# Patient Record
Sex: MALE | Race: BLACK OR AFRICAN AMERICAN
[De-identification: names, ages, dates, MRNs, and addresses within clinical notes are randomized per-mention and may not be internally consistent; named-entity substitution may affect disease eponyms.]

---

## 2018-04-20 ENCOUNTER — HOSPITAL ENCOUNTER (INPATIENT)
Dept: HOSPITAL 72 - EMR | Age: 68
LOS: 4 days | Discharge: LEFT BEFORE DISCHARGE | DRG: 140 | End: 2018-04-24
Payer: MEDICARE

## 2018-04-20 VITALS — BODY MASS INDEX: 34.21 KG/M2 | WEIGHT: 218 LBS | HEIGHT: 67 IN

## 2018-04-20 DIAGNOSIS — J98.01: ICD-10-CM

## 2018-04-20 DIAGNOSIS — I20.0: ICD-10-CM

## 2018-04-20 DIAGNOSIS — R10.9: ICD-10-CM

## 2018-04-20 DIAGNOSIS — K27.9: ICD-10-CM

## 2018-04-20 DIAGNOSIS — K20.9: ICD-10-CM

## 2018-04-20 DIAGNOSIS — G40.909: ICD-10-CM

## 2018-04-20 DIAGNOSIS — F31.9: ICD-10-CM

## 2018-04-20 DIAGNOSIS — E87.6: ICD-10-CM

## 2018-04-20 DIAGNOSIS — R07.89: ICD-10-CM

## 2018-04-20 DIAGNOSIS — I25.5: ICD-10-CM

## 2018-04-20 DIAGNOSIS — I11.0: ICD-10-CM

## 2018-04-20 DIAGNOSIS — J44.1: Primary | ICD-10-CM

## 2018-04-20 DIAGNOSIS — R11.2: ICD-10-CM

## 2018-04-20 DIAGNOSIS — R07.81: ICD-10-CM

## 2018-04-20 DIAGNOSIS — I51.3: ICD-10-CM

## 2018-04-20 DIAGNOSIS — Z85.46: ICD-10-CM

## 2018-04-20 DIAGNOSIS — K21.9: ICD-10-CM

## 2018-04-20 DIAGNOSIS — R12: ICD-10-CM

## 2018-04-20 DIAGNOSIS — I50.9: ICD-10-CM

## 2018-04-20 DIAGNOSIS — E78.5: ICD-10-CM

## 2018-04-20 PROCEDURE — 82553 CREATINE MB FRACTION: CPT

## 2018-04-20 PROCEDURE — 83735 ASSAY OF MAGNESIUM: CPT

## 2018-04-20 PROCEDURE — 82550 ASSAY OF CK (CPK): CPT

## 2018-04-20 PROCEDURE — 80053 COMPREHEN METABOLIC PANEL: CPT

## 2018-04-20 PROCEDURE — 94003 VENT MGMT INPAT SUBQ DAY: CPT

## 2018-04-20 PROCEDURE — 80307 DRUG TEST PRSMV CHEM ANLYZR: CPT

## 2018-04-20 PROCEDURE — 82962 GLUCOSE BLOOD TEST: CPT

## 2018-04-20 PROCEDURE — 84484 ASSAY OF TROPONIN QUANT: CPT

## 2018-04-20 PROCEDURE — 85025 COMPLETE CBC W/AUTO DIFF WBC: CPT

## 2018-04-20 PROCEDURE — 80185 ASSAY OF PHENYTOIN TOTAL: CPT

## 2018-04-20 PROCEDURE — 94640 AIRWAY INHALATION TREATMENT: CPT

## 2018-04-20 PROCEDURE — 94760 N-INVAS EAR/PLS OXIMETRY 1: CPT

## 2018-04-20 PROCEDURE — 93005 ELECTROCARDIOGRAM TRACING: CPT

## 2018-04-20 PROCEDURE — 80299 QUANTITATIVE ASSAY DRUG: CPT

## 2018-04-20 PROCEDURE — 84443 ASSAY THYROID STIM HORMONE: CPT

## 2018-04-20 PROCEDURE — 36415 COLL VENOUS BLD VENIPUNCTURE: CPT

## 2018-04-20 PROCEDURE — 93306 TTE W/DOPPLER COMPLETE: CPT

## 2018-04-20 PROCEDURE — 94150 VITAL CAPACITY TEST: CPT

## 2018-04-20 PROCEDURE — 99285 EMERGENCY DEPT VISIT HI MDM: CPT

## 2018-04-20 PROCEDURE — 76700 US EXAM ABDOM COMPLETE: CPT

## 2018-04-20 PROCEDURE — 71045 X-RAY EXAM CHEST 1 VIEW: CPT

## 2018-04-20 PROCEDURE — 94664 DEMO&/EVAL PT USE INHALER: CPT

## 2018-04-21 VITALS — DIASTOLIC BLOOD PRESSURE: 70 MMHG | SYSTOLIC BLOOD PRESSURE: 120 MMHG

## 2018-04-21 VITALS — SYSTOLIC BLOOD PRESSURE: 115 MMHG | DIASTOLIC BLOOD PRESSURE: 61 MMHG

## 2018-04-21 VITALS — DIASTOLIC BLOOD PRESSURE: 88 MMHG | SYSTOLIC BLOOD PRESSURE: 133 MMHG

## 2018-04-21 VITALS — DIASTOLIC BLOOD PRESSURE: 88 MMHG | SYSTOLIC BLOOD PRESSURE: 128 MMHG

## 2018-04-21 VITALS — SYSTOLIC BLOOD PRESSURE: 113 MMHG | DIASTOLIC BLOOD PRESSURE: 86 MMHG

## 2018-04-21 VITALS — DIASTOLIC BLOOD PRESSURE: 75 MMHG | SYSTOLIC BLOOD PRESSURE: 148 MMHG

## 2018-04-21 LAB
ADD MANUAL DIFF: NO
ADD MANUAL DIFF: NO
ALBUMIN SERPL-MCNC: 2.9 G/DL (ref 3.4–5)
ALBUMIN SERPL-MCNC: 3.7 G/DL (ref 3.4–5)
ALBUMIN/GLOB SERPL: 0.7 {RATIO} (ref 1–2.7)
ALBUMIN/GLOB SERPL: 0.8 {RATIO} (ref 1–2.7)
ALP SERPL-CCNC: 55 U/L (ref 46–116)
ALP SERPL-CCNC: 69 U/L (ref 46–116)
ALT SERPL-CCNC: 15 U/L (ref 12–78)
ALT SERPL-CCNC: 18 U/L (ref 12–78)
ANION GAP SERPL CALC-SCNC: 10 MMOL/L (ref 5–15)
ANION GAP SERPL CALC-SCNC: 11 MMOL/L (ref 5–15)
AST SERPL-CCNC: 19 U/L (ref 15–37)
AST SERPL-CCNC: 21 U/L (ref 15–37)
BASOPHILS NFR BLD AUTO: 0.9 % (ref 0–2)
BASOPHILS NFR BLD AUTO: 1.6 % (ref 0–2)
BILIRUB SERPL-MCNC: 0.2 MG/DL (ref 0.2–1)
BILIRUB SERPL-MCNC: 0.3 MG/DL (ref 0.2–1)
BUN SERPL-MCNC: 10 MG/DL (ref 7–18)
BUN SERPL-MCNC: 8 MG/DL (ref 7–18)
CALCIUM SERPL-MCNC: 8.5 MG/DL (ref 8.5–10.1)
CALCIUM SERPL-MCNC: 9.8 MG/DL (ref 8.5–10.1)
CHLORIDE SERPL-SCNC: 102 MMOL/L (ref 98–107)
CHLORIDE SERPL-SCNC: 107 MMOL/L (ref 98–107)
CK MB SERPL-MCNC: 0.5 NG/ML (ref 0–3.6)
CK SERPL-CCNC: 157 U/L (ref 26–308)
CO2 SERPL-SCNC: 23 MMOL/L (ref 21–32)
CO2 SERPL-SCNC: 27 MMOL/L (ref 21–32)
CREAT SERPL-MCNC: 1.1 MG/DL (ref 0.55–1.3)
CREAT SERPL-MCNC: 1.3 MG/DL (ref 0.55–1.3)
EOSINOPHIL NFR BLD AUTO: 0.2 % (ref 0–3)
EOSINOPHIL NFR BLD AUTO: 4 % (ref 0–3)
ERYTHROCYTE [DISTWIDTH] IN BLOOD BY AUTOMATED COUNT: 14.7 % (ref 11.6–14.8)
ERYTHROCYTE [DISTWIDTH] IN BLOOD BY AUTOMATED COUNT: 14.9 % (ref 11.6–14.8)
GLOBULIN SER-MCNC: 3.9 G/DL
GLOBULIN SER-MCNC: 4.8 G/DL
HCT VFR BLD CALC: 38.6 % (ref 42–52)
HCT VFR BLD CALC: 46.6 % (ref 42–52)
HGB BLD-MCNC: 12.9 G/DL (ref 14.2–18)
HGB BLD-MCNC: 15.6 G/DL (ref 14.2–18)
LYMPHOCYTES NFR BLD AUTO: 21 % (ref 20–45)
LYMPHOCYTES NFR BLD AUTO: 52.3 % (ref 20–45)
MCV RBC AUTO: 91 FL (ref 80–99)
MCV RBC AUTO: 92 FL (ref 80–99)
MONOCYTES NFR BLD AUTO: 11.5 % (ref 1–10)
MONOCYTES NFR BLD AUTO: 3.3 % (ref 1–10)
NEUTROPHILS NFR BLD AUTO: 30.6 % (ref 45–75)
NEUTROPHILS NFR BLD AUTO: 74.6 % (ref 45–75)
PLATELET # BLD: 204 K/UL (ref 150–450)
PLATELET # BLD: 236 K/UL (ref 150–450)
POTASSIUM SERPL-SCNC: 3.3 MMOL/L (ref 3.5–5.1)
POTASSIUM SERPL-SCNC: 4.2 MMOL/L (ref 3.5–5.1)
RBC # BLD AUTO: 4.23 M/UL (ref 4.7–6.1)
RBC # BLD AUTO: 5.05 M/UL (ref 4.7–6.1)
SODIUM SERPL-SCNC: 139 MMOL/L (ref 136–145)
SODIUM SERPL-SCNC: 141 MMOL/L (ref 136–145)
WBC # BLD AUTO: 4.6 K/UL (ref 4.8–10.8)
WBC # BLD AUTO: 4.9 K/UL (ref 4.8–10.8)

## 2018-04-21 RX ADMIN — HEPARIN SODIUM SCH UNITS: 5000 INJECTION INTRAVENOUS; SUBCUTANEOUS at 20:48

## 2018-04-21 RX ADMIN — PHENYTOIN SCH MG: 125 SUSPENSION ORAL at 20:45

## 2018-04-21 RX ADMIN — MONTELUKAST SODIUM SCH MG: 10 TABLET, COATED ORAL at 16:04

## 2018-04-21 RX ADMIN — IPRATROPIUM BROMIDE AND ALBUTEROL SULFATE SCH ML: .5; 3 SOLUTION RESPIRATORY (INHALATION) at 14:45

## 2018-04-21 RX ADMIN — IPRATROPIUM BROMIDE AND ALBUTEROL SULFATE SCH ML: .5; 3 SOLUTION RESPIRATORY (INHALATION) at 10:57

## 2018-04-21 RX ADMIN — Medication PRN MG: at 20:46

## 2018-04-21 RX ADMIN — IPRATROPIUM BROMIDE AND ALBUTEROL SULFATE SCH ML: .5; 3 SOLUTION RESPIRATORY (INHALATION) at 19:00

## 2018-04-21 RX ADMIN — Medication PRN MG: at 18:18

## 2018-04-21 RX ADMIN — METHYLPREDNISOLONE SODIUM SUCCINATE SCH MG: 40 INJECTION, POWDER, LYOPHILIZED, FOR SOLUTION INTRAMUSCULAR; INTRAVENOUS at 13:43

## 2018-04-21 RX ADMIN — METHYLPREDNISOLONE SODIUM SUCCINATE SCH MG: 40 INJECTION, POWDER, LYOPHILIZED, FOR SOLUTION INTRAMUSCULAR; INTRAVENOUS at 05:33

## 2018-04-21 RX ADMIN — IPRATROPIUM BROMIDE AND ALBUTEROL SULFATE SCH ML: .5; 3 SOLUTION RESPIRATORY (INHALATION) at 22:22

## 2018-04-21 RX ADMIN — METHYLPREDNISOLONE SODIUM SUCCINATE SCH MG: 40 INJECTION, POWDER, LYOPHILIZED, FOR SOLUTION INTRAMUSCULAR; INTRAVENOUS at 21:05

## 2018-04-21 RX ADMIN — IPRATROPIUM BROMIDE AND ALBUTEROL SULFATE SCH ML: .5; 3 SOLUTION RESPIRATORY (INHALATION) at 07:14

## 2018-04-21 RX ADMIN — LOSARTAN POTASSIUM SCH MG: 50 TABLET, FILM COATED ORAL at 20:45

## 2018-04-21 NOTE — DIAGNOSTIC IMAGING REPORT
Indication: Shortness of breath

 

Technique: XRAY Chest 1v

 

Comparison: 9/25/2013

 

Findings: Cardiac silhouette appears prominent. Atherosclerotic changes are seen.

There is mild pulmonary vascular congestion. Atelectasis is noted in the lung bases.

Degenerative changes of the spine are seen.

 

Impression: 

 

Mild pulmonary vascular congestion. Lung base atelectasis. Clinical

correlation/follow-up recommended.

## 2018-04-21 NOTE — EMERGENCY ROOM REPORT
History of Present Illness


General


Chief Complaint:  Dyspnea/Respdistress


Source:  Patient





Present Illness


HPI


Patient is a 68-year-old male brought in by EMS after increased difficulty 

breathing.  Patient prior history of asthma.  He reports having increased 

nonproductive cough.  He had been given breathing treatment by EMS.  Patient 

reported having gradually worsening symptoms.  Patient had prior history of 

seizure disorder as well.  The patient reports being compliant with his 

Dilantin as well as Lamictal.  He denied recent seizure.  The patient denies 

any chest pain.


Allergies:  


Coded Allergies:  


     No Known Allergies (Verified , 11/3/06)





Patient History


Past Medical History:  see triage record


Reviewed Nursing Documentation:  PMH: Agreed; PSxH: Agreed





Nursing Documentation-PMH


Hx Cardiac Problems:  Yes - HIGH CHOLESTEROL


Hx Hypertension:  Yes


Hx Asthma:  Yes


Hx Cancer:  Yes


Hx Seizures:  Yes


Hx Headaches:  Yes





Review of Systems


All Other Systems:  limited - by poor historian





Physical Exam





Vital Signs








  Date Time  Temp Pulse Resp B/P (MAP) Pulse Ox O2 Delivery O2 Flow Rate FiO2


 


4/20/18 23:34 98.5 80 14 115/61 98   





 98.4       


 


4/21/18 00:30      Room Air  








Sp02 EP Interpretation:  reviewed, normal


General Appearance:  normal inspection, alert, mild distress


Head:  atraumatic


ENT:  normal ENT inspection, hearing grossly normal, normal voice


Neck:  normal inspection, full range of motion, supple, no bony tend


Respiratory:  no retraction, wheezing, expiration - prolonged expiration


Cardiovascular #1:  regular rate, rhythm, no edema


Gastrointestinal:  normal inspection, normal bowel sounds, non tender, soft, no 

guarding, no hernia


Genitourinary:  no CVA tenderness


Musculoskeletal:  normal inspection, back normal, normal range of motion


Neurologic:  normal inspection, alert, oriented x3, responsive, CNs III-XII nml 

as tested, speech normal


Psychiatric:  normal inspection, judgement/insight normal, mood/affect normal


Skin:  normal inspection, normal color, no rash





Medical Decision Making


Diagnostic Impression:  


 Primary Impression:  


 Asthma exacerbation


 Additional Impression:  


 Seizure disorder


ER Course


Patient presented for shortness of breath.  Differential included but was not 

limited to anemia, pneumonia, pneumothorax, myocardial infarction, pericardial 

effusion, congestive heart failure, acidosis. Because of complexity of patient'

s case laboratory testing and imaging studies were ordered.


Laboratory studies were unremarkable.  The patient was given IV steroids as 

well as breathing treatments.  Chest x-ray one view interpreted by me showed 

cardiomegaly without evident infiltrate.  EKG interpreted by me showed normal 

sinus rhythm without acute ST or T wave changes.Dr. Zheng Moreno was contacted 

for inpatient management due to continued shortness of breath.





Labs








Test


  4/21/18


00:28


 


White Blood Count


  4.6 K/UL


(4.8-10.8)


 


Red Blood Count


  4.23 M/UL


(4.70-6.10)


 


Hemoglobin


  12.9 G/DL


(14.2-18.0)


 


Hematocrit


  38.6 %


(42.0-52.0)


 


Mean Corpuscular Volume 91 FL (80-99) 


 


Mean Corpuscular Hemoglobin


  30.4 PG


(27.0-31.0)


 


Mean Corpuscular Hemoglobin


Concent 33.4 G/DL


(32.0-36.0)


 


Red Cell Distribution Width


  14.9 %


(11.6-14.8)


 


Platelet Count


  204 K/UL


(150-450)


 


Mean Platelet Volume


  5.0 FL


(6.5-10.1)


 


Neutrophils (%) (Auto)


  30.6 %


(45.0-75.0)


 


Lymphocytes (%) (Auto)


  52.3 %


(20.0-45.0)


 


Monocytes (%) (Auto)


  11.5 %


(1.0-10.0)


 


Eosinophils (%) (Auto)


  4.0 %


(0.0-3.0)


 


Basophils (%) (Auto)


  1.6 %


(0.0-2.0)


 


Sodium Level


  141 MMOL/L


(136-145)


 


Potassium Level


  3.3 MMOL/L


(3.5-5.1)


 


Chloride Level


  107 MMOL/L


()


 


Carbon Dioxide Level


  23 MMOL/L


(21-32)


 


Anion Gap


  11 mmol/L


(5-15)


 


Blood Urea Nitrogen 8 mg/dL (7-18) 


 


Creatinine


  1.1 MG/DL


(0.55-1.30)


 


Estimat Glomerular Filtration


Rate > 60 mL/min


(>60)


 


Glucose Level


  99 MG/DL


()


 


Calcium Level


  8.5 MG/DL


(8.5-10.1)


 


Total Bilirubin


  0.2 MG/DL


(0.2-1.0)


 


Aspartate Amino Transf


(AST/SGOT) 19 U/L (15-37) 


 


 


Alanine Aminotransferase


(ALT/SGPT) 15 U/L (12-78) 


 


 


Alkaline Phosphatase


  55 U/L


()


 


Total Creatine Kinase


  157 U/L


()


 


Creatine Kinase MB


  0.5 NG/ML


(0.0-3.6)


 


Creatine Kinase MB Relative


Index 0.3 


 


 


Troponin I


  0.004 ng/mL


(0.000-0.056)


 


Total Protein


  6.8 G/DL


(6.4-8.2)


 


Albumin


  2.9 G/DL


(3.4-5.0)


 


Globulin 3.9 g/dL 


 


Albumin/Globulin Ratio 0.7 (1.0-2.7) 


 


Urine Opiates Screen


  Negative


(NEGATIVE)


 


Urine Barbiturates Screen


  Negative


(NEGATIVE)


 


Phencyclidine (PCP) Screen


  Negative


(NEGATIVE)


 


Urine Amphetamines Screen


  Negative


(NEGATIVE)


 


Urine Benzodiazepines Screen


  Negative


(NEGATIVE)


 


Urine Cocaine Screen


  Negative


(NEGATIVE)


 


Urine Marijuana (THC) Screen


  Negative


(NEGATIVE)











Last Vital Signs








  Date Time  Temp Pulse Resp B/P (MAP) Pulse Ox O2 Delivery O2 Flow Rate FiO2


 


4/21/18 03:53 98.4 89 18 115/61 100 Room Air  





 98.4       








Status:  unchanged


Disposition:  ADMITTED AS INPATIENT


Condition:  Stable


Referrals:  


NON PHYSICIAN (PCP)











Rocael Soler Apr 21, 2018 05:34

## 2018-04-21 NOTE — CONSULTATION
DATE OF CONSULTATION:  04/21/2018



CARDIOLOGY CONSULTATION



CONSULTING PHYSICIAN:  Zheng Moreno M.D.



REQUESTING PHYSICIAN:  Steven Prakash M.D.



REASON FOR CONSULTATION:  Shortness of breath and chest pain.



HISTORY OF PRESENT ILLNESS:  This is a 68-year-old  male.

He presented to the emergency room by paramedics with difficulty

breathing, chest tightness, and abdominal fullness.  He has had increasing

nonproductive cough over the past day or two and has increased the use of

his respiratory treatments at home.  He has albuterol and ipratropium that

he uses by nebulizer.  He also has a CPAP machine.



The patient has not had any fevers or chills, nausea, vomiting, or

change in bowel habits.  He does have a seizure disorder.  He takes

Dilantin and Lamictal and notes being compliant.  He has not had chest

pain, but notes tightness in his chest with breathing.



PAST MEDICAL HISTORY:  Hypertension, chronic obstructive pulmonary disease,

seizure disorder, history of prostate cancer, hyperlipidemia, and sleep

disorder.



MEDICATIONS:  Reviewed and reconciled.



ALLERGIES:  None.



FAMILY HISTORY:  Noncontributory.



REVIEW OF SYSTEMS:  No fevers or chills.  No history of blood clots in the

legs.  No history of positive PPD or tuberculosis exposure.  No history of

stroke.  He does have a seizure disorder, but no recent seizures noted.

No nausea or vomiting.  No change in bowel habits.  No history of diabetes

or thyroid disorder.



PHYSICAL EXAMINATION:

VITAL SIGNS:  Blood pressure 115/61, pulse 80, respirations 14, afebrile,

and 98% oxygen saturation.

NECK:  Supple.

LUNGS:  With diminished breath sounds.  Scattered expiratory wheezes and

rales.  No subcostal retractions.  No accessory muscle use.

CARDIAC:  Regular rhythm and rate.  Normal S1, S2 with no murmur, rub, or

gallop.

ABDOMEN:  Distended, but soft.  No guarding or rebound.  No CVA

tenderness.

EXTREMITIES:  No clubbing, cyanosis, or edema.  Capillary refill is good.

NEUROLOGIC:  Nonfocal with no tremor.



LABORATORY DATA:  White count 4.6 and hemoglobin 12.9.  BUN 11 and

creatinine 1.1.  Sodium 141, potassium 3.3, and bicarbonate 23.  Troponin

is 0.004 CK is 157.  Tox screen is negative.



IMPRESSION:

1. Acute respiratory insufficiency.

2. Chronic obstructive pulmonary disease exacerbation.

3. Acute bronchospasm.

4. Pleuritic chest pain.

5. History of hypertension.

6. Hypokalemia.

7. Acute myocardial ischemia.

8. Seizure disorder.



PLAN:

1. Cardiac monitoring.

2. Replace potassium.

3. Inhaled bronchodilators.

4. Intravenous steroids.

5. Hold antibiotics.

6. DVT and stress ulcer prophylaxes.

7. Check Dilantin level and re-dose accordingly.

8. We will discuss with primary care physician and pulmonologist further

plan of care.

9. No additional cardiovascular workup presently planned other than

echocardiogram for assessment of PA systolic pressure.









  ______________________________________________

  Zheng Moreno M.D.





DR:  GABRIELLA

D:  04/21/2018 15:51

T:  04/21/2018 19:50

JOB#:  [id number]

CC:

## 2018-04-21 NOTE — CONSULTATION
DATE OF CONSULTATION:  04/21/2018



PULMONARY CONSULTATION



REASON FOR CONSULTATION:  Asthma.



HISTORY OF PRESENT ILLNESS:  The patient is a 68-year-old male brought in

with increasing difficulty breathing.  The patient with history of asthma.

The patient with history of cough which is mostly nonproductive.  The

patient noted worsening of symptoms, seen and evaluated in the emergency

room.  In the emergency room, the patient was given breathing treatments

as well as IV Solu-Medrol.  The patient did undergo imaging and now

admitted for further care and management and stabilization.  I was asked

to evaluate and recommend further.



PAST MEDICAL HISTORY:  Notable for hypertension, asthma, seizures,

headaches.



MEDICATIONS:  Reviewed.



ALLERGIES:  Reviewed.



SOCIAL HISTORY:  Currently nonsmoker and nondrinker.  The patient is

otherwise independent.  The patient is retired.



REVIEW OF SYSTEMS:  Otherwise negative with the exception of the above.



PHYSICAL EXAMINATION:

GENERAL:  A well-developed male.  The patient is in no acute distress.

VITAL SIGNS:  Blood pressure 148/75, O2 saturations 93% on room air,

respiratory rate 18, temperature 97 degrees.

HEENT:  Negative.  Extraocular movements grossly intact

NECK:  Supple.

LUNGS:  With moderate breath sounds, symmetric, wheezes scattered.

 

CARDIAC:  S1 and S2.  Regular rate and rhythm without murmurs, rubs,

gallops.

ABDOMEN:  Soft, nontender, nondistended.

EXTREMITIES:  No cyanosis, clubbing, or edema.

NEUROLOGIC:  Grossly nonfocal.



LABORATORY DATA:  Reviewed.  White count 4.6, hemoglobin 12.9.  Chemistry

is noted notable for potassium of 2.3.  Albumin 2.9.



IMPRESSION:

1. Asthma with acute exacerbation.

2. Shortness of breath.

3. History of seizures.

4. History of headaches.

5. Possible underlying respiratory infection.



RECOMMENDATIONS:  I agree with management.  Albuterol for now.  DVT

prophylaxis.  IV Solu-Medrol.  Monitor clinically.  Discharge on

combination of inhaled steroids and long-acting bronchodilator as well as

prednisone taper.  Once improved with outpatient followup, pulmonary

function testing and further evaluation for allergic triggers.  Care

discussed and reviewed with the patient.  I will follow clinically.









  ______________________________________________

  Kenneth Burns M.D.





:  Tommy

D:  04/21/2018 09:39

T:  04/21/2018 17:39

JOB#:  [id number]

CC:



RICHARD

## 2018-04-22 VITALS — DIASTOLIC BLOOD PRESSURE: 86 MMHG | SYSTOLIC BLOOD PRESSURE: 137 MMHG

## 2018-04-22 VITALS — SYSTOLIC BLOOD PRESSURE: 146 MMHG | DIASTOLIC BLOOD PRESSURE: 95 MMHG

## 2018-04-22 VITALS — DIASTOLIC BLOOD PRESSURE: 69 MMHG | SYSTOLIC BLOOD PRESSURE: 139 MMHG

## 2018-04-22 VITALS — DIASTOLIC BLOOD PRESSURE: 75 MMHG | SYSTOLIC BLOOD PRESSURE: 125 MMHG

## 2018-04-22 VITALS — SYSTOLIC BLOOD PRESSURE: 135 MMHG | DIASTOLIC BLOOD PRESSURE: 86 MMHG

## 2018-04-22 VITALS — SYSTOLIC BLOOD PRESSURE: 128 MMHG | DIASTOLIC BLOOD PRESSURE: 81 MMHG

## 2018-04-22 RX ADMIN — IPRATROPIUM BROMIDE AND ALBUTEROL SULFATE SCH ML: .5; 3 SOLUTION RESPIRATORY (INHALATION) at 06:52

## 2018-04-22 RX ADMIN — LOSARTAN POTASSIUM SCH MG: 50 TABLET, FILM COATED ORAL at 20:36

## 2018-04-22 RX ADMIN — METHYLPREDNISOLONE SODIUM SUCCINATE SCH MG: 40 INJECTION, POWDER, LYOPHILIZED, FOR SOLUTION INTRAMUSCULAR; INTRAVENOUS at 05:59

## 2018-04-22 RX ADMIN — Medication PRN MG: at 20:36

## 2018-04-22 RX ADMIN — MONTELUKAST SODIUM SCH MG: 10 TABLET, COATED ORAL at 17:09

## 2018-04-22 RX ADMIN — METHYLPREDNISOLONE SODIUM SUCCINATE SCH MG: 40 INJECTION, POWDER, LYOPHILIZED, FOR SOLUTION INTRAMUSCULAR; INTRAVENOUS at 14:46

## 2018-04-22 RX ADMIN — PHENYTOIN SCH MG: 125 SUSPENSION ORAL at 08:39

## 2018-04-22 RX ADMIN — HEPARIN SODIUM SCH UNITS: 5000 INJECTION INTRAVENOUS; SUBCUTANEOUS at 08:44

## 2018-04-22 RX ADMIN — Medication PRN MG: at 07:36

## 2018-04-22 RX ADMIN — Medication PRN MG: at 12:44

## 2018-04-22 RX ADMIN — IPRATROPIUM BROMIDE AND ALBUTEROL SULFATE SCH ML: .5; 3 SOLUTION RESPIRATORY (INHALATION) at 19:22

## 2018-04-22 RX ADMIN — FLUTICASONE PROPIONATE AND SALMETEROL SCH PUFFS: 50; 250 POWDER RESPIRATORY (INHALATION) at 19:21

## 2018-04-22 RX ADMIN — IPRATROPIUM BROMIDE AND ALBUTEROL SULFATE SCH ML: .5; 3 SOLUTION RESPIRATORY (INHALATION) at 15:16

## 2018-04-22 RX ADMIN — IPRATROPIUM BROMIDE AND ALBUTEROL SULFATE SCH ML: .5; 3 SOLUTION RESPIRATORY (INHALATION) at 02:59

## 2018-04-22 RX ADMIN — LOSARTAN POTASSIUM SCH MG: 50 TABLET, FILM COATED ORAL at 08:39

## 2018-04-22 RX ADMIN — HEPARIN SODIUM SCH UNITS: 5000 INJECTION INTRAVENOUS; SUBCUTANEOUS at 20:40

## 2018-04-22 RX ADMIN — IPRATROPIUM BROMIDE AND ALBUTEROL SULFATE SCH ML: .5; 3 SOLUTION RESPIRATORY (INHALATION) at 23:07

## 2018-04-22 RX ADMIN — IPRATROPIUM BROMIDE AND ALBUTEROL SULFATE SCH ML: .5; 3 SOLUTION RESPIRATORY (INHALATION) at 11:29

## 2018-04-22 NOTE — PULMONOLOGY PROGRESS NOTE
Assessment/Plan


Assessment/Plan


1. Asthma with acute exacerbation.


2. Shortness of breath.


3. History of seizures.


4. History of headaches.





PLAN


Continue same for now


IV Solu-Medrol as is and plan to taper in a.m.


Nebulized therapy as outlined


Oxygen therapy as needed


Ambulate as able


We will start inhaled steroids as well as long-acting bronchodilators


Plan for outpatient follow-up including pulmonary function testing and allergy 

testing


impression, plan, and exam edited and reviewed in detail


care discussed with RN





Subjective


Allergies:  


Coded Allergies:  


     No Known Allergies (Verified , 11/3/06)


Subjective


Care note and reviewed


Patient seems to be a bit better





Objective





Last 24 Hour Vital Signs








  Date Time  Temp Pulse Resp B/P (MAP) Pulse Ox O2 Delivery O2 Flow Rate FiO2


 


4/22/18 11:29  90 20  95 Nasal Cannula 2.0 28


 


4/22/18 08:39    125/75    


 


4/22/18 08:00  104      


 


4/22/18 08:00 97.9 104 20 125/75 91 Nasal Cannula 2.0 





 97.9       


 


4/22/18 06:58  103 20  94 Room Air  


 


4/22/18 06:55      Room Air  21


 


4/22/18 06:55     94 Room Air  21


 


4/22/18 06:52  91 20  92 Room Air  21


 


4/22/18 04:00  86      


 


4/22/18 04:00 97.9 92 20 139/69 95 Nasal Cannula 2.0 





 97.9       


 


4/22/18 03:10  90 18  97 Room Air  


 


4/22/18 02:59  91 22  93 Room Air  21


 


4/22/18 00:00  82      


 


4/22/18 00:00 97.7 81 20 146/95 90 Nasal Cannula 2.0 





 97.7       


 


4/21/18 22:32  88 18  97 Room Air  


 


4/21/18 22:22  85 22  94 Room Air  21


 


4/21/18 20:45    133/88    


 


4/21/18 20:15      Nasal Cannula  


 


4/21/18 20:15      Room Air  


 


4/21/18 20:15      Nasal Cannula 2.0 28


 


4/21/18 20:15     95 Nasal Cannula 2.0 28


 


4/21/18 20:00 97.3 93 20 133/88 95 Nasal Cannula 2.0 





 97.3       


 


4/21/18 20:00  91      


 


4/21/18 16:00 97.2 94 18 128/88 95 Nasal Cannula 2.0 





 97.2       


 


4/21/18 16:00  92      


 


4/21/18 14:55     96 Nasal Cannula 2.0 28


 


4/21/18 14:55  85 18  97 Nasal Cannula 2.0 28


 


4/21/18 14:55      Nasal Cannula 2.0 28


 


4/21/18 14:45  83 18  92 Room Air  21


 


4/21/18 12:00  84      


 


4/21/18 12:00 97.0 81 20 120/70 98 Room Air  





 97.0       

















Intake and Output  


 


 4/21/18 4/22/18





 19:00 07:00


 


Intake Total 480 ml 500 ml


 


Output Total  100 ml


 


Balance 480 ml 400 ml


 


  


 


Intake Oral 480 ml 500 ml


 


Output Emesis  100 ml


 


# Voids 3 3








Objective


Well-developed well-nourished male


No acute distress


No jugular venous distention


Lungs with reduced air entry with occasional wheeze


Cardiac exam with normal S1-S2 regular rate rhythm without murmurs rubs gallops


Exam on the abdomen is overall benign no normoactive bowel sounds


No cyanosis clubbing or edema


Neurologically grossly nonfocal and alert





Current Medications








 Medications


  (Trade)  Dose


 Ordered  Sig/Magen


 Route


 PRN Reason  Start Time


 Stop Time Status Last Admin


Dose Admin


 


 Albuterol/


 Ipratropium


  (Albuterol/


 Ipratropium)  3 ml  Q4HRT


 HHN


   4/21/18 07:00


 4/26/18 06:59  4/22/18 11:29


 


 


 Calcium Carbonate


  (Tums)  500 mg  THREE TIMES A DAY  PRN


 ORAL


 HEARTBURN  4/21/18 10:30


 5/21/18 10:29  4/22/18 07:36


 


 


 Guaifenesin/


 Dextromethorphan


  (Robitussin DM)  10 ml  Q4H  PRN


 ORAL


 For Cough  4/22/18 10:00


 5/22/18 09:59  4/22/18 09:40


 


 


 Heparin Sodium


  (Porcine)


  (Heparin 5000


 units/ml)  5,000 units  EVERY 12  HOURS


 SUBQ


   4/21/18 21:00


 5/21/18 20:59  4/22/18 08:44


 


 


 Losartan Potassium


  (Cozaar)  50 mg  EVERY 12  HOURS


 ORAL


   4/21/18 21:00


 5/21/18 20:59  4/22/18 08:39


 


 


 Methylprednisolone


 Sodium Succinate


  (Solu-MEDROL)  40 mg  EVERY 8  HOURS


 IVP


   4/21/18 06:00


 5/21/18 05:59  4/22/18 05:59


 


 


 Montelukast Sodium


  (Singulair)  10 mg  QPM


 ORAL


   4/21/18 16:30


 5/21/18 16:29  4/21/18 16:04


 


 


 Ondansetron HCl


  (Zofran)  4 mg  Q4H  PRN


 IVP


 Nausea & Vomiting  4/22/18 02:00


 5/22/18 01:59  4/22/18 07:44


 


 


 Pantoprazole


  (Protonix)  40 mg  DAILY


 ORAL


   4/22/18 10:00


 5/22/18 09:59  4/22/18 10:19


 


 


 Phenytoin


  (Dilantin)  300 mg  EVERY 12  HOURS


 NG


   4/21/18 21:00


 5/21/18 20:59  4/22/18 08:39


 


 


 Quetiapine


 Fumarate


  (SEROquel)  150 mg  BEDTIME


 ORAL


   4/21/18 21:00


 5/21/18 20:59  4/21/18 20:46


 

















CARIE OLIVER Apr 22, 2018 11:49

## 2018-04-22 NOTE — HISTORY AND PHYSICAL REPORT
DATE OF ADMISSION:  04/21/2018



CHIEF COMPLAINT:  Chest pain and shortness of breath.



HISTORY OF PRESENT ILLNESS:  The patient is a pleasant 68-year-old male.

He has history of prediabetes, hypertension, asthma/COPD, and ischemic

cardiomyopathy.  He presented with complaints of chest pain.  According to

the patient, he was well.  On the morning of admission, developed

substernal chest pain with shortness of breath, presented to the emergency

room.  On evaluation there, his initial vital signs were stable.  He was

saturating well.  Initial set of cardiac enzymes was negative.  He was

noted to be hypokalemic.  Chest x-ray showed pulmonary vascular

congestion.  The patient was given a breathing treatment, a dose of

aspirin, and a dose of intravenous steroids.  He is now admitted for

further evaluation and care.



PAST MEDICAL HISTORY:  As above.  He has a history of seizure disorder.



CURRENT MEDICATIONS:  Reconciled and reviewed.



ALLERGIES:  None.



SOCIAL HISTORY:  Negative for alcohol or drugs.  The patient is a prior

smoker.



FAMILY HISTORY:  None.



PHYSICAL EXAMINATION:

VITAL SIGNS:  Temperature 98 degrees, pulse 89, respirations 18, and blood

pressure 151/61.

GENERAL:  The patient is well developed, no apparent distress.  He is up in

bed and speaking in full sentences.

NECK:  Supple.

HEART:  Regular rate and rhythm.

LUNGS:  Significant diminished breath sounds with scattered

wheezes.

ABDOMEN:  Soft, nontender, and nondistended.

EXTREMITIES:  Without clubbing, cyanosis, or edema.



LABORATORY DATA:  The white count was 4, hemoglobin 15, hematocrit 46,

platelet are 236,000.  Sodium 139, potassium is 4.2, chloride 102,

bicarbonate 27, BUN of 10, and creatinine is 1.3.  Troponin was 0.004.



ASSESSMENT:  This is a pleasant male, admitted with complaints of shortness

of breath.



1. Shortness of breath.

2. Congestive heart failure and chronic obstructive pulmonary disease

exacerbation.

3. History of ischemic cardiomyopathy.

4. Possible unstable angina.

5. Hypertension.

6. Seizure disorder.



PLAN:

1. Intravenous steroids.

2. Respiratory treatments.

3. Supplemental oxygen.

4. P.r.n. diuresis.

5. Continue seizure medications.

6. The patient's family are bringing his current medications, so that

could be reviewed.









  ______________________________________________

  Steven Prakash M.D.





DR:  Jose

D:  04/22/2018 08:35

T:  04/22/2018 17:56

JOB#:  [id number]

CC:

## 2018-04-22 NOTE — PROGRESS NOTE
DATE:  04/22/2018



CARDIOLOGY PROGRESS NOTE



SUBJECTIVE:  The patient continues to complain of severe mid epigastric and

chest pain after meals.  His medication regimen was reviewed and his

medication order was updated.  The patient has been on nonsteroidal drugs

at home.



OBJECTIVE:

VITAL SIGNS:  Blood pressure 128/81, heart rate 101, respiratory rate 20,

afebrile, and oxygen saturation 93% on 2 liters nasal cannula.

LUNGS:  Diminished breath sounds.  Few rhonchi.  No wheezing.

CARDIAC:  Regular rhythm and rate.  Normal S1, S2 with a fourth heart

sound.

ABDOMEN:  Distended, but soft.  Mild midepigastric tenderness.

EXTREMITIES:  No edema.



LABORATORY DATA:  White count 4.9, hemoglobin 15.6, potassium 4.2, BUN 10,

creatinine 1.3.  Magnesium 2.2.  Liver function tests all within normal

limits.  Albumin 3.7.  TSH 0.8.



IMPRESSION:

1. Chronic obstructive pulmonary disease exacerbation, improving.

2. Noncardiac chest pain.

3. Mid epigastric pain, possibly peptic ulcer disease.  Consider

hepatobiliary process.

4. Low likelihood for an acute coronary insufficiency.

5. Seizure disorder.

6. Hypertension, controlled.



PLAN:  Abdominal ultrasound.  Gastrointestinal consultation.  H2 blockers

and antacids.  Review EKG.  Steroid taper.  Repeat troponin level.

Further recommendations will follow.









  ______________________________________________

  Zheng Moreno M.D.





DR:  Marisela

D:  04/22/2018 19:28

T:  04/22/2018 20:04

JOB#:  [id number]

CC:

## 2018-04-23 VITALS — SYSTOLIC BLOOD PRESSURE: 142 MMHG | DIASTOLIC BLOOD PRESSURE: 86 MMHG

## 2018-04-23 VITALS — SYSTOLIC BLOOD PRESSURE: 143 MMHG | DIASTOLIC BLOOD PRESSURE: 85 MMHG

## 2018-04-23 VITALS — SYSTOLIC BLOOD PRESSURE: 155 MMHG | DIASTOLIC BLOOD PRESSURE: 72 MMHG

## 2018-04-23 VITALS — DIASTOLIC BLOOD PRESSURE: 87 MMHG | SYSTOLIC BLOOD PRESSURE: 115 MMHG

## 2018-04-23 VITALS — DIASTOLIC BLOOD PRESSURE: 76 MMHG | SYSTOLIC BLOOD PRESSURE: 137 MMHG

## 2018-04-23 LAB
ALBUMIN SERPL-MCNC: 3.5 G/DL (ref 3.4–5)
ALBUMIN/GLOB SERPL: 0.9 {RATIO} (ref 1–2.7)
ALP SERPL-CCNC: 59 U/L (ref 46–116)
ALT SERPL-CCNC: 22 U/L (ref 12–78)
ANION GAP SERPL CALC-SCNC: 7 MMOL/L (ref 5–15)
AST SERPL-CCNC: 21 U/L (ref 15–37)
BILIRUB SERPL-MCNC: 0.4 MG/DL (ref 0.2–1)
BUN SERPL-MCNC: 11 MG/DL (ref 7–18)
CALCIUM SERPL-MCNC: 9.7 MG/DL (ref 8.5–10.1)
CHLORIDE SERPL-SCNC: 103 MMOL/L (ref 98–107)
CO2 SERPL-SCNC: 30 MMOL/L (ref 21–32)
CREAT SERPL-MCNC: 1.2 MG/DL (ref 0.55–1.3)
GLOBULIN SER-MCNC: 4 G/DL
POTASSIUM SERPL-SCNC: 4.2 MMOL/L (ref 3.5–5.1)
SODIUM SERPL-SCNC: 140 MMOL/L (ref 136–145)

## 2018-04-23 RX ADMIN — IPRATROPIUM BROMIDE AND ALBUTEROL SULFATE SCH ML: .5; 3 SOLUTION RESPIRATORY (INHALATION) at 15:18

## 2018-04-23 RX ADMIN — IPRATROPIUM BROMIDE AND ALBUTEROL SULFATE SCH ML: .5; 3 SOLUTION RESPIRATORY (INHALATION) at 03:00

## 2018-04-23 RX ADMIN — FLUTICASONE PROPIONATE AND SALMETEROL SCH PUFFS: 50; 250 POWDER RESPIRATORY (INHALATION) at 09:52

## 2018-04-23 RX ADMIN — IPRATROPIUM BROMIDE AND ALBUTEROL SULFATE SCH ML: .5; 3 SOLUTION RESPIRATORY (INHALATION) at 11:44

## 2018-04-23 RX ADMIN — ASPIRIN SCH MG: 81 TABLET, DELAYED RELEASE ORAL at 08:41

## 2018-04-23 RX ADMIN — LOSARTAN POTASSIUM SCH MG: 50 TABLET, FILM COATED ORAL at 20:53

## 2018-04-23 RX ADMIN — MONTELUKAST SODIUM SCH MG: 10 TABLET, COATED ORAL at 17:00

## 2018-04-23 RX ADMIN — IPRATROPIUM BROMIDE AND ALBUTEROL SULFATE SCH ML: .5; 3 SOLUTION RESPIRATORY (INHALATION) at 23:05

## 2018-04-23 RX ADMIN — Medication PRN MG: at 08:49

## 2018-04-23 RX ADMIN — HEPARIN SODIUM SCH UNITS: 5000 INJECTION INTRAVENOUS; SUBCUTANEOUS at 08:51

## 2018-04-23 RX ADMIN — IPRATROPIUM BROMIDE AND ALBUTEROL SULFATE SCH ML: .5; 3 SOLUTION RESPIRATORY (INHALATION) at 20:30

## 2018-04-23 RX ADMIN — IPRATROPIUM BROMIDE AND ALBUTEROL SULFATE SCH ML: .5; 3 SOLUTION RESPIRATORY (INHALATION) at 08:04

## 2018-04-23 RX ADMIN — HEPARIN SODIUM SCH UNITS: 5000 INJECTION INTRAVENOUS; SUBCUTANEOUS at 20:52

## 2018-04-23 RX ADMIN — LOSARTAN POTASSIUM SCH MG: 50 TABLET, FILM COATED ORAL at 08:43

## 2018-04-23 RX ADMIN — FLUTICASONE PROPIONATE AND SALMETEROL SCH PUFFS: 50; 250 POWDER RESPIRATORY (INHALATION) at 20:37

## 2018-04-23 NOTE — PROGRESS NOTE
DATE:  04/23/2018



CARDIOLOGY PROGRESS NOTE



SUBJECTIVE:  The patient feels better today.  Less abdominal pain.  No

shortness of breath.



OBJECTIVE:

VITAL SIGNS:  Blood pressure 143/85, pulse 85, respirations 20, and oxygen

saturation on 2 liters is 96%.

HEENT:  Oropharynx clear.

NECK:  Supple.  No thrush.

LUNGS:  With diminished breath sounds.  No wheezing.

CARDIAC:  Regular.  Normal S1, S2.

ABDOMEN:  Soft.

EXTREMITIES:  No edema.



IMPRESSION:

1. COPD exacerbation, improved.

2. Chest pain, noncardiac, rather it is pleuritic.

3. Peptic ulcer disease with abdominal pain and postprandial discomfort

predominantly.

4. Hypertensive heart disease.

5. Seizure disorder.



PLAN:  EGD, abdominal ultrasound, off steroids, reassess antihypertensive

therapy, may need additional titration of medications.  Continue

antiseizure therapy.









  ______________________________________________

  Zheng Moreno M.D.





DR:  MARILYN

D:  04/23/2018 10:53

T:  04/23/2018 23:37

JOB#:  [id number]

CC:

## 2018-04-23 NOTE — CONSULTATION
History of Present Illness


General


Date patient seen:  Apr 23, 2018


Chief Complaint:  Dyspnea/Respdistress





Present Illness


HPI


68-year-old male with history of prediabetes, hypertension, asthma/COPD, 

bipolar d/o and ischemic cardiomyopathy. the pt has been labile and easily 

agitated. the pt was hostile and threatening over the weekend. Per Dr. Edward, 

the pt's nurse reported to Dr. Edward that the pt told staff "I want to shoot my 

wife with a gum." Then the charge nurse told me today that the pt told one of 

the nurses that He would go to [facility] and kill some staff there as 

they treated him with disrespect and threw him out.  During the evaluation, the 

pt was somewhat uncooperative and agitated. He admitted that he was going to 

kill [facility] staff. He stated that he didn't have a gun. His nurse was in the 

room however she stated that she did not hear/listen to my conversation with 

the patient and she would not document. Then she stated she heard him saying, 

something along those line. The charge nurse was notified as well as the sw. 

Per charge the sw didn't see the necessity to contact [employer] nor the involving 

parties.


Allergies:  


Coded Allergies:  


     No Known Allergies (Verified , 11/3/06)





Medication History


Scheduled


Aspirin* (Aspir 81*), 81 MG ORAL DAILY, (Reported)


Docusate Sodium* (Docusate Sodium*), 200 MG ORAL TWICE A DAY, (Reported)





Scheduled PRN


Hydrocodone/Acetaminophen 5-500 (Vicodin 5-500), 1 TAB ORAL Q6H PRN, (Reported)





Miscellaneous Medications


[Dilantin], (Reported)


[Famotidine], (Reported)


[Lamotrigine], (Reported)


[Losartan], (Reported)


[Montelukast], (Reported)


[Seroquel], (Reported)


[Seroquel], (Reported)


[Simvastatin], (Reported)


[Tramadol], (Reported)





Patient History


Limited by:  medical condition


History Provided By:  Patient, Medical Record, PMD


Healthcare decision maker





Resuscitation status


Full Code


Advanced Directive on File


No





Past Medical/Surgical History


Past Medical/Surgical History:  


(1) Dyspnea


(2) Asthma exacerbation


(3) Seizure disorder


(4) Unstable angina


(5) Shortness of breath


(6) Abdominal pain





Family History


Family History:  


(1) Dyspnea


(2) Asthma exacerbation


(3) Seizure disorder


(4) Unstable angina


(5) Shortness of breath


(6) Abdominal pain





Review of Systems


Psychiatric:  Reports: prior hx, anxiety, depressed feelings, emotional problems





Physical Exam


General Appearance:  no apparent distress, alert


Neurologic:  alert, oriented x 3, responsive, depressed affect





Last 24 Hour Vital Signs








  Date Time  Temp Pulse Resp B/P (MAP) Pulse Ox O2 Delivery O2 Flow Rate FiO2


 


4/23/18 11:54  84 20  94 Nasal Cannula 2.0 28


 


4/23/18 11:44  88 18  95 Nasal Cannula 2.0 28


 


4/23/18 09:52  84 20  95 Nasal Cannula 2.0 28


 


4/23/18 09:52  84 20  95 Nasal Cannula 2.0 28


 


4/23/18 08:43    115/87    


 


4/23/18 08:05     94 Nasal Cannula 2.0 28


 


4/23/18 08:05  86 18  94 Nasal Cannula 2.0 28


 


4/23/18 08:05      Nasal Cannula 2.0 28


 


4/23/18 08:05  86 20  95 Nasal Cannula 2.0 28


 


4/23/18 08:00 97.6 92 20 115/87 93 Room Air  





 97.6       


 


4/23/18 08:00  76      


 


4/23/18 04:00 97.3 85 20 143/85 96 Nasal Cannula 2.0 





 97.3       


 


4/23/18 04:00  86      


 


4/23/18 03:07      Nasal Cannula  


 


4/23/18 03:07      Nasal Cannula  


 


4/23/18 00:00  87      


 


4/22/18 23:15  87 20  96 Nasal Cannula 2.0 28


 


4/22/18 23:07  84 20  94 Nasal Cannula 2.0 28


 


4/22/18 20:36    135/86    


 


4/22/18 20:00  95      


 


4/22/18 20:00 97.9 99 20 135/86 94 Room Air  





 97.9       


 


4/22/18 19:31  89 20  96 Nasal Cannula 2.0 28


 


4/22/18 19:29  88 20  94 Nasal Cannula 2.0 28


 


4/22/18 19:28  87 20  94 Nasal Cannula 2.0 28


 


4/22/18 19:20      Nasal Cannula 2.0 28


 


4/22/18 19:20  87 20  93 Nasal Cannula 2.0 28


 


4/22/18 19:19     93 Nasal Cannula 2.0 28


 


4/22/18 16:00 97.9 101 20 128/81 93 Room Air  





 97.9       


 


4/22/18 16:00  83      


 


4/22/18 15:24  88 20  96 Nasal Cannula 2.0 28


 


4/22/18 15:16  88 20  94 Nasal Cannula 2.0 28

















Intake and Output  


 


 4/22/18 4/23/18





 19:00 07:00


 


Intake Total 510 ml 400 ml


 


Balance 510 ml 400 ml


 


  


 


Intake Oral 510 ml 400 ml











Laboratory Tests








Test


  4/23/18


07:05


 


Sodium Level


  140 MMOL/L


(136-145)


 


Potassium Level


  4.2 MMOL/L


(3.5-5.1)


 


Chloride Level


  103 MMOL/L


()


 


Carbon Dioxide Level


  30 MMOL/L


(21-32)


 


Anion Gap


  7 mmol/L


(5-15)


 


Blood Urea Nitrogen


  11 mg/dL


(7-18)


 


Creatinine


  1.2 MG/DL


(0.55-1.30)


 


Estimat Glomerular Filtration


Rate > 60 mL/min


(>60)


 


Glucose Level


  83 MG/DL


()


 


Calcium Level


  9.7 MG/DL


(8.5-10.1)


 


Total Bilirubin


  0.4 MG/DL


(0.2-1.0)


 


Aspartate Amino Transf


(AST/SGOT) 21 U/L (15-37)


 


 


Alanine Aminotransferase


(ALT/SGPT) 22 U/L (12-78)


 


 


Alkaline Phosphatase


  59 U/L


()


 


Troponin I


  0.000 ng/mL


(0.000-0.056)


 


Total Protein


  7.5 G/DL


(6.4-8.2)


 


Albumin


  3.5 G/DL


(3.4-5.0)


 


Globulin 4.0 g/dL  


 


Albumin/Globulin Ratio


  0.9 (1.0-2.7)


L








Height (Feet):  5


Height (Inches):  7.00


Weight (Pounds):  218


Medications





Current Medications








 Medications


  (Trade)  Dose


 Ordered  Sig/Magen


 Route


 PRN Reason  Start Time


 Stop Time Status Last Admin


Dose Admin


 


 Al Hydroxide/Mg


 Hydroxide


  (Mylanta)  30 ml  BEFORE MEALS AND  HS


 ORAL


   4/22/18 16:30


 5/22/18 16:29  4/23/18 12:00


 


 


 Albuterol/


 Ipratropium


  (Albuterol/


 Ipratropium)  3 ml  Q4HRT


 HHN


   4/21/18 07:00


 4/26/18 06:59  4/23/18 11:44


 


 


 Aspirin


  (Ecotrin)  81 mg  DAILY


 ORAL


   4/23/18 09:00


 5/23/18 08:59  4/23/18 08:41


 


 


 Calcium Carbonate


  (Tums)  500 mg  THREE TIMES A DAY  PRN


 ORAL


 HEARTBURN  4/21/18 10:30


 5/21/18 10:29  4/23/18 08:49


 


 


 Famotidine


  (Pepcid)  20 mg  BID


 ORAL


   4/22/18 18:00


 5/22/18 17:59  4/23/18 08:41


 


 


 Gabapentin


  (Neurontin)  300 mg  BID


 ORAL


   4/22/18 18:00


 5/22/18 17:59  4/23/18 08:41


 


 


 Guaifenesin/


 Dextromethorphan


  (Robitussin DM)  10 ml  Q4H  PRN


 ORAL


 For Cough  4/22/18 10:00


 5/22/18 09:59  4/22/18 09:40


 


 


 Heparin Sodium


  (Porcine)


  (Heparin 5000


 units/ml)  5,000 units  EVERY 12  HOURS


 SUBQ


   4/21/18 21:00


 5/21/18 20:59  4/23/18 08:51


 


 


 Levetiracetam


  (Keppra)  750 mg  Q12HR


 ORAL


   4/22/18 21:00


 5/22/18 20:59  4/23/18 08:49


 


 


 Losartan Potassium


  (Cozaar)  50 mg  EVERY 12  HOURS


 ORAL


   4/21/18 21:00


 5/21/18 20:59  4/23/18 08:43


 


 


 Montelukast Sodium


  (Singulair)  10 mg  QPM


 ORAL


   4/21/18 16:30


 5/21/18 16:29  4/22/18 17:09


 


 


 Ondansetron HCl


  (Zofran)  4 mg  Q4H  PRN


 IVP


 Nausea & Vomiting  4/22/18 02:00


 5/22/18 01:59  4/22/18 07:44


 


 


 Pantoprazole


  (Protonix)  40 mg  DAILY


 ORAL


   4/22/18 10:00


 5/22/18 09:59  4/23/18 08:41


 


 


 Prednisone


  (predniSONE)  20 mg  DAILY


 ORAL


   4/23/18 09:00


 5/23/18 08:59  4/23/18 08:43


 


 


 Quetiapine


 Fumarate


  (SEROquel)  300 mg  BEDTIME


 ORAL


   4/22/18 21:00


 5/22/18 20:59  4/22/18 20:37


 


 


 Salmeterol


 Xinafoate/


 Fluticasone


  (Advair 250/50


 Diskus)  1 puffs  BID


 INH


   4/22/18 18:00


 5/22/18 17:59  4/23/18 09:52


 











Assessment/Plan


Status:  stable, progressing


Assessment/Plan


Bipolar d/o


Not at imminent dts/dto





-D/w Dr. Moreno who was on the floor


-Left a VM for wife and notified her


-spoke to charge nurse again


-ask the contact mitali and andreina as well as wife











Eleazar Gutierrez M.D. Apr 23, 2018 13:54

## 2018-04-23 NOTE — PULMONOLOGY PROGRESS NOTE
Assessment/Plan


Assessment/Plan


1. Asthma with acute exacerbation.


2. Shortness of breath.


3. History of seizures.


4. History of headaches.





PLAN


Continue same for now


dc Solu-Medrol 


Nebulized therapy as outlined


Oxygen therapy as needed


Ambulate as able


inhaled steroids as well as long-acting bronchodilators


Plan for outpatient follow-up including pulmonary function testing and allergy 

testing


impression, plan, and exam edited and reviewed in detail


care discussed with RN





Subjective


Allergies:  


Coded Allergies:  


     No Known Allergies (Verified , 11/3/06)


Subjective


Care note and reviewed


for procedure today


Patient seems stable





Objective





Last 24 Hour Vital Signs








  Date Time  Temp Pulse Resp B/P (MAP) Pulse Ox O2 Delivery O2 Flow Rate FiO2


 


4/23/18 04:00 97.3 85 20 143/85 96 Nasal Cannula 2.0 





 97.3       


 


4/23/18 04:00  86      


 


4/23/18 03:07      Nasal Cannula  


 


4/23/18 03:07      Nasal Cannula  


 


4/23/18 00:00  87      


 


4/22/18 23:15  87 20  96 Nasal Cannula 2.0 28


 


4/22/18 23:07  84 20  94 Nasal Cannula 2.0 28


 


4/22/18 20:36    135/86    


 


4/22/18 20:00  95      


 


4/22/18 20:00 97.9 99 20 135/86 94 Room Air  





 97.9       


 


4/22/18 19:31  89 20  96 Nasal Cannula 2.0 28


 


4/22/18 19:29  88 20  94 Nasal Cannula 2.0 28


 


4/22/18 19:28  87 20  94 Nasal Cannula 2.0 28


 


4/22/18 19:20      Nasal Cannula 2.0 28


 


4/22/18 19:20  87 20  93 Nasal Cannula 2.0 28


 


4/22/18 19:19     93 Nasal Cannula 2.0 28


 


4/22/18 16:00 97.9 101 20 128/81 93 Room Air  





 97.9       


 


4/22/18 16:00  83      


 


4/22/18 15:24  88 20  96 Nasal Cannula 2.0 28


 


4/22/18 15:16  88 20  94 Nasal Cannula 2.0 28


 


4/22/18 12:00  75      


 


4/22/18 12:00 98.3 91 20 137/86 92 Nasal Cannula 2.0 





 98.3       


 


4/22/18 11:40  90 20  95 Nasal Cannula 2.0 28


 


4/22/18 11:29  90 20  95 Nasal Cannula 2.0 28


 


4/22/18 08:39    125/75    


 


4/22/18 08:00  104      


 


4/22/18 08:00 97.9 104 20 125/75 91 Nasal Cannula 2.0 





 97.9       

















Intake and Output  


 


 4/22/18 4/23/18





 19:00 07:00


 


Intake Total 510 ml 400 ml


 


Balance 510 ml 400 ml


 


  


 


Intake Oral 510 ml 400 ml








Objective


Well-developed well-nourished male


No acute distress


No jugular venous distention


Lungs with reduced air entry with occasional wheeze


Cardiac exam with normal S1-S2 regular rate rhythm without murmurs rubs gallops


Exam on the abdomen is overall benign no normoactive bowel sounds


No cyanosis clubbing or edema


Neurologically grossly nonfocal and alert





Current Medications








 Medications


  (Trade)  Dose


 Ordered  Sig/Magen


 Route


 PRN Reason  Start Time


 Stop Time Status Last Admin


Dose Admin


 


 Al Hydroxide/Mg


 Hydroxide


  (Mylanta)  30 ml  BEFORE MEALS AND  HS


 ORAL


   4/22/18 16:30


 5/22/18 16:29  4/23/18 05:49


 


 


 Albuterol/


 Ipratropium


  (Albuterol/


 Ipratropium)  3 ml  Q4HRT


 HHN


   4/21/18 07:00


 4/26/18 06:59  4/22/18 23:07


 


 


 Aspirin


  (Ecotrin)  81 mg  DAILY


 ORAL


   4/23/18 09:00


 5/23/18 08:59   


 


 


 Calcium Carbonate


  (Tums)  500 mg  THREE TIMES A DAY  PRN


 ORAL


 HEARTBURN  4/21/18 10:30


 5/21/18 10:29  4/22/18 20:36


 


 


 Famotidine


  (Pepcid)  20 mg  BID


 ORAL


   4/22/18 18:00


 5/22/18 17:59  4/22/18 18:55


 


 


 Gabapentin


  (Neurontin)  300 mg  BID


 ORAL


   4/22/18 18:00


 5/22/18 17:59  4/22/18 18:55


 


 


 Guaifenesin/


 Dextromethorphan


  (Robitussin DM)  10 ml  Q4H  PRN


 ORAL


 For Cough  4/22/18 10:00


 5/22/18 09:59  4/22/18 09:40


 


 


 Heparin Sodium


  (Porcine)


  (Heparin 5000


 units/ml)  5,000 units  EVERY 12  HOURS


 SUBQ


   4/21/18 21:00


 5/21/18 20:59  4/22/18 20:40


 


 


 Levetiracetam


  (Keppra)  750 mg  Q12HR


 ORAL


   4/22/18 21:00


 5/22/18 20:59  4/22/18 20:36


 


 


 Losartan Potassium


  (Cozaar)  50 mg  EVERY 12  HOURS


 ORAL


   4/21/18 21:00


 5/21/18 20:59  4/22/18 20:36


 


 


 Methylprednisolone


 Sodium Succinate


  (Solu-MEDROL)  40 mg  DAILY


 IVP


   4/23/18 09:00


 5/23/18 08:59   


 


 


 Montelukast Sodium


  (Singulair)  10 mg  QPM


 ORAL


   4/21/18 16:30


 5/21/18 16:29  4/22/18 17:09


 


 


 Ondansetron HCl


  (Zofran)  4 mg  Q4H  PRN


 IVP


 Nausea & Vomiting  4/22/18 02:00


 5/22/18 01:59  4/22/18 07:44


 


 


 Pantoprazole


  (Protonix)  40 mg  DAILY


 ORAL


   4/22/18 10:00


 5/22/18 09:59  4/22/18 10:19


 


 


 Quetiapine


 Fumarate


  (SEROquel)  300 mg  BEDTIME


 ORAL


   4/22/18 21:00


 5/22/18 20:59  4/22/18 20:37


 


 


 Salmeterol


 Xinafoate/


 Fluticasone


  (Advair 250/50


 Diskus)  1 puffs  BID


 INH


   4/22/18 18:00


 5/22/18 17:59  4/22/18 19:21


 

















CARIE OLIVER Apr 23, 2018 07:55

## 2018-04-23 NOTE — GENERAL PROGRESS NOTE
Assessment/Plan


Assessment/Plan


GI CONSULT


ATSP for vomiting and pyrosis


will schedule for EGD in am


Thank you


Yasmine Latham MD





Subjective


Allergies:  


Coded Allergies:  


     No Known Allergies (Verified , 11/3/06)





Objective





Last 24 Hour Vital Signs








  Date Time  Temp Pulse Resp B/P (MAP) Pulse Ox O2 Delivery O2 Flow Rate FiO2


 


4/23/18 16:00  106      


 


4/23/18 16:00 98.0 92 21 142/86 93 Room Air  





 98.0       


 


4/23/18 15:26  87 18  95 Nasal Cannula 2.0 28


 


4/23/18 15:18  87 18  95 Nasal Cannula 2.0 28


 


4/23/18 12:00  83      


 


4/23/18 12:00 98.0 93 18 137/76 93 Room Air  





 98.0       


 


4/23/18 11:54  84 20  94 Nasal Cannula 2.0 28


 


4/23/18 11:44  88 18  95 Nasal Cannula 2.0 28


 


4/23/18 09:52  84 20  95 Nasal Cannula 2.0 28


 


4/23/18 09:52  84 20  95 Nasal Cannula 2.0 28


 


4/23/18 08:43    115/87    


 


4/23/18 08:05     94 Nasal Cannula 2.0 28


 


4/23/18 08:05  86 18  94 Nasal Cannula 2.0 28


 


4/23/18 08:05      Nasal Cannula 2.0 28


 


4/23/18 08:05  86 20  95 Nasal Cannula 2.0 28


 


4/23/18 08:00 97.6 92 20 115/87 93 Room Air  





 97.6       


 


4/23/18 08:00  76      


 


4/23/18 04:00 97.3 85 20 143/85 96 Nasal Cannula 2.0 





 97.3       


 


4/23/18 04:00  86      


 


4/23/18 03:07      Nasal Cannula  


 


4/23/18 03:07      Nasal Cannula  


 


4/23/18 00:00  87      


 


4/22/18 23:15  87 20  96 Nasal Cannula 2.0 28


 


4/22/18 23:07  84 20  94 Nasal Cannula 2.0 28


 


4/22/18 20:36    135/86    

















Intake and Output  


 


 4/22/18 4/23/18





 19:00 07:00


 


Intake Total 510 ml 400 ml


 


Balance 510 ml 400 ml


 


  


 


Intake Oral 510 ml 400 ml








Laboratory Tests


4/23/18 07:05: 


Sodium Level 140, Potassium Level 4.2, Chloride Level 103, Carbon Dioxide Level 

30, Anion Gap 7, Blood Urea Nitrogen 11, Creatinine 1.2, Estimat Glomerular 

Filtration Rate > 60, Glucose Level 83, Calcium Level 9.7, Total Bilirubin 0.4, 

Aspartate Amino Transf (AST/SGOT) 21, Alanine Aminotransferase (ALT/SGPT) 22, 

Alkaline Phosphatase 59, Troponin I 0.000, Total Protein 7.5, Albumin 3.5, 

Globulin 4.0, Albumin/Globulin Ratio 0.9L


Height (Feet):  5


Height (Inches):  7.00


Weight (Pounds):  218











YASMINE LATHAM Apr 23, 2018 20:34

## 2018-04-23 NOTE — GENERAL PROGRESS NOTE
Assessment/Plan


Problem List:  


(1) Unstable angina


ICD Codes:  I20.0 - Unstable angina


SNOMED:  5446514, 425439640


(2) Abdominal pain


ICD Codes:  R10.9 - Unspecified abdominal pain


SNOMED:  58281660


(3) Dyspnea


ICD Codes:  R06.00 - Dyspnea, unspecified


SNOMED:  513797270


(4) Asthma exacerbation


ICD Codes:  J45.901 - Unspecified asthma with (acute) exacerbation


SNOMED:  879261087


(5) Shortness of breath


ICD Codes:  R06.02 - Shortness of breath


SNOMED:  101632762


(6) Seizure disorder


Status:  stable, progressing


Assessment/Plan


iv steroids


resp rx 


check echo


await abd hiram


psych eval called.


PPi rx





Subjective


ROS Limited/Unobtainable:  No


Constitutional:  Reports: malaise, weakness


HEENT:  Reports: no symptoms


Cardiovascular:  Reports: no symptoms


Respiratory:  Reports: cough, shortness of breath


Gastrointestinal/Abdominal:  Reports: abdominal pain


Genitourinary:  Reports: no symptoms


Neurologic/Psychiatric:  Reports: no symptoms


Endocrine:  Reports: no symptoms


Hematologic/Lymphatic:  Reports: no symptoms


Allergies:  


Coded Allergies:  


     No Known Allergies (Verified , 11/3/06)


All Systems:  reviewed and negative except above


Subjective


above noted. pt has been verbalizing violence with his gun. +abd pain. no fever 

or chills.





Objective





Last 24 Hour Vital Signs








  Date Time  Temp Pulse Resp B/P (MAP) Pulse Ox O2 Delivery O2 Flow Rate FiO2


 


4/23/18 04:00 97.3 85 20 143/85 96 Nasal Cannula 2.0 





 97.3       


 


4/23/18 04:00  86      


 


4/23/18 03:07      Nasal Cannula  


 


4/23/18 03:07      Nasal Cannula  


 


4/23/18 00:00  87      


 


4/22/18 23:15  87 20  96 Nasal Cannula 2.0 28


 


4/22/18 23:07  84 20  94 Nasal Cannula 2.0 28


 


4/22/18 20:36    135/86    


 


4/22/18 20:00  95      


 


4/22/18 20:00 97.9 99 20 135/86 94 Room Air  





 97.9       


 


4/22/18 19:31  89 20  96 Nasal Cannula 2.0 28


 


4/22/18 19:29  88 20  94 Nasal Cannula 2.0 28


 


4/22/18 19:28  87 20  94 Nasal Cannula 2.0 28


 


4/22/18 19:20      Nasal Cannula 2.0 28


 


4/22/18 19:20  87 20  93 Nasal Cannula 2.0 28


 


4/22/18 19:19     93 Nasal Cannula 2.0 28


 


4/22/18 16:00 97.9 101 20 128/81 93 Room Air  





 97.9       


 


4/22/18 16:00  83      


 


4/22/18 15:24  88 20  96 Nasal Cannula 2.0 28


 


4/22/18 15:16  88 20  94 Nasal Cannula 2.0 28


 


4/22/18 12:00  75      


 


4/22/18 12:00 98.3 91 20 137/86 92 Nasal Cannula 2.0 





 98.3       


 


4/22/18 11:40  90 20  95 Nasal Cannula 2.0 28 4/22/18 11:29  90 20  95 Nasal Cannula 2.0 28 4/22/18 08:39    125/75    


 


4/22/18 08:00  104      


 


4/22/18 08:00 97.9 104 20 125/75 91 Nasal Cannula 2.0 





 97.9       

















Intake and Output  


 


 4/22/18 4/23/18





 19:00 07:00


 


Intake Total 510 ml 


 


Balance 510 ml 


 


  


 


Intake Oral 510 ml 








Height (Feet):  5


Height (Inches):  7.00


Weight (Pounds):  218


General Appearance:  WD/WN, confused


Neck:  supple


Cardiovascular:  normal rate, regular rhythm


Respiratory/Chest:  chest wall non-tender, lungs clear, normal breath sounds, 

no respiratory distress


Abdomen:  normal bowel sounds, non tender, soft, no organomegaly


Edema:  no edema noted Arm (L), no edema noted Arm (R), no edema noted Leg (L), 

no edema noted Leg (R), no edema noted Pedal (L), no edema noted Pedal (R), no 

edema noted Generalized











JIGNA DIXON Apr 23, 2018 07:28

## 2018-04-23 NOTE — CONSULTATION
DATE OF CONSULTATION:  04/23/2018



NOTE: "POOR AUDIO QUALITY"



GASTROENTEROLOGY CONSULTATION



CONSULTING PHYSICIAN:  Yasmine Latham M.D.



CHIEF COMPLAINT:  I was asked to see this patient by Dr. Zheng Moreno for

evaluation of gastrointestinal symptoms.



HISTORY OF PRESENT ILLNESS:  The patient is a very unfortunate 68-year-old

 man with multiple medical problems including type 2

diabetes, hypertension, ischemic cardiomyopathy, who came to the hospital

due to chest pain.  The patient has been seen by Cardiology, but it

appears to be more of gastrointestinal _____.  The patient complains of

severe retrosternal pyrosis especially after eating.  He had a lot of

vomiting last night.  There was no blood in his emesis, but vomiting

occurred 5 or 6 times back-to-back.  He did not have any endoscopy, but

his last colonoscopy was about 4 years ago.  He is on proton-pump

inhibitor as well as H2 blocker.  There are multiple people in the family

with various cancers.



PAST MEDICAL HISTORY:  History of hypertension, prediabetes, asthma, COPD,

ischemic cardiomyopathy, seizure disorder, and bipolar disorder.



MEDICATIONS:  See chart list for details.



SOCIAL HISTORY:  The patient does not smoke, but he drinks alcohol.  He is

.



FAMILY HISTORY:  Positive for father with tongue cancer, mother with breast

cancer, and sister with another type of cancer which is not clear to the

patient.



REVIEW OF SYSTEMS:  Otherwise negative.



PHYSICAL EXAMINATION:

GENERAL:  Well-developed obese  man, seen in his room.

HEENT:  Normocephalic and atraumatic.  Sclerae anicteric.  Oropharynx

clear.

NECK:  Supple.

CHEST:  Clear to auscultation.

CARDIOVASCULAR:  Regular rate.

ABDOMEN:  Soft.  Good bowel sounds.

EXTREMITIES:  Revealed no edema.



LABORATORY DATA:  Noted.



ASSESSMENT:  This patient presents with severe pyrosis refractory to

acid-blockade as well as nausea, vomiting, and postprandial pain.  Given

the possible etiology and not responsive to medication, the patient

elected to undergo endoscopy tomorrow to evaluate the upper GI tract for

ulcers and other pathologies.  Indications, risks, alternatives, and

possible complications were explained and informed consent was obtained.



RECOMMENDATIONS:

1. P.o. diet as tolerated.

2. Continue Pepcid and proton-pump inhibitor.

3. Endoscopy tomorrow.

4. Further recommendations to follow.



Thank you for asking me to participate in the care of this patient.









  ______________________________________________

  Yasmine Latham M.D.





DR:  KEEGAN

D:  04/23/2018 20:38

T:  04/23/2018 22:24

JOB#:  [id number]

CC:

## 2018-04-23 NOTE — DIAGNOSTIC IMAGING REPORT
Indication: Abdominal pain

 

Technique: Gray-scale and duplex images of the upper abdomen were obtained

 

Comparison:

 

Findings: Gallbladder is unremarkable, without stones, wall thickening, nor

pericholecystic fluid. It is incompletely distended  Sonographic Petersen's sign is

negative.  Common bile duct measures 5 mm in diameter.  No intrahepatic biliary

ductal dilatation.  Liver demonstrates diffusely increased echogenicity, consistent

with diffuse hepatocellular disease, most likely fatty change.   Portal vein and

hepatic veins are patent.   Pancreas is obscured by bowel gas.    Spleen is

unremarkable.  Left kidney measures 11.2 cm in length.  Right kidney measures 10.5 cm

length.  Both kidneys demonstrate normal echogenicity.  There is no hydronephrosis.  

There are bilateral renal cysts . Abdominal aorta is obscured by bowel gas .

 

Impression: Liver demonstrates diffusely increased echogenicity, consistent with

diffuse hepatocellular disease, most likely fatty change.

 

Negative for gallstones or dilated ducts

 

Note inability to visualize the pancreas and abdominal aorta

## 2018-04-24 VITALS — SYSTOLIC BLOOD PRESSURE: 137 MMHG | DIASTOLIC BLOOD PRESSURE: 84 MMHG

## 2018-04-24 VITALS — SYSTOLIC BLOOD PRESSURE: 134 MMHG | DIASTOLIC BLOOD PRESSURE: 63 MMHG

## 2018-04-24 VITALS — SYSTOLIC BLOOD PRESSURE: 148 MMHG | DIASTOLIC BLOOD PRESSURE: 94 MMHG

## 2018-04-24 VITALS — SYSTOLIC BLOOD PRESSURE: 133 MMHG | DIASTOLIC BLOOD PRESSURE: 91 MMHG

## 2018-04-24 VITALS — DIASTOLIC BLOOD PRESSURE: 41 MMHG | SYSTOLIC BLOOD PRESSURE: 120 MMHG

## 2018-04-24 VITALS — SYSTOLIC BLOOD PRESSURE: 143 MMHG | DIASTOLIC BLOOD PRESSURE: 91 MMHG

## 2018-04-24 VITALS — DIASTOLIC BLOOD PRESSURE: 83 MMHG | SYSTOLIC BLOOD PRESSURE: 140 MMHG

## 2018-04-24 VITALS — SYSTOLIC BLOOD PRESSURE: 138 MMHG | DIASTOLIC BLOOD PRESSURE: 87 MMHG

## 2018-04-24 VITALS — SYSTOLIC BLOOD PRESSURE: 139 MMHG | DIASTOLIC BLOOD PRESSURE: 87 MMHG

## 2018-04-24 PROCEDURE — 0DB38ZX EXCISION OF LOWER ESOPHAGUS, VIA NATURAL OR ARTIFICIAL OPENING ENDOSCOPIC, DIAGNOSTIC: ICD-10-PCS

## 2018-04-24 PROCEDURE — 0DB18ZX EXCISION OF UPPER ESOPHAGUS, VIA NATURAL OR ARTIFICIAL OPENING ENDOSCOPIC, DIAGNOSTIC: ICD-10-PCS

## 2018-04-24 PROCEDURE — 0DB28ZX EXCISION OF MIDDLE ESOPHAGUS, VIA NATURAL OR ARTIFICIAL OPENING ENDOSCOPIC, DIAGNOSTIC: ICD-10-PCS

## 2018-04-24 PROCEDURE — 0DB78ZX EXCISION OF STOMACH, PYLORUS, VIA NATURAL OR ARTIFICIAL OPENING ENDOSCOPIC, DIAGNOSTIC: ICD-10-PCS

## 2018-04-24 RX ADMIN — IPRATROPIUM BROMIDE AND ALBUTEROL SULFATE SCH ML: .5; 3 SOLUTION RESPIRATORY (INHALATION) at 06:52

## 2018-04-24 RX ADMIN — HEPARIN SODIUM SCH UNITS: 5000 INJECTION INTRAVENOUS; SUBCUTANEOUS at 09:00

## 2018-04-24 RX ADMIN — FLUTICASONE PROPIONATE AND SALMETEROL SCH PUFFS: 50; 250 POWDER RESPIRATORY (INHALATION) at 06:52

## 2018-04-24 RX ADMIN — IPRATROPIUM BROMIDE AND ALBUTEROL SULFATE SCH ML: .5; 3 SOLUTION RESPIRATORY (INHALATION) at 02:10

## 2018-04-24 RX ADMIN — LOSARTAN POTASSIUM SCH MG: 50 TABLET, FILM COATED ORAL at 08:57

## 2018-04-24 RX ADMIN — ASPIRIN SCH MG: 81 TABLET, DELAYED RELEASE ORAL at 08:57

## 2018-04-24 NOTE — GENERAL PROGRESS NOTE
Assessment/Plan


Assessment/Plan


bipolar d/o





xeroquel





Subjective


Date patient seen:  Apr 24, 2018


Neurologic/Psychiatric:  Reports: anxiety, depressed, emotional problems


Allergies:  


Coded Allergies:  


     No Known Allergies (Verified , 11/3/06)


Subjective


the pt left the hospital ama. the staff did not contact me





Objective





Last 24 Hour Vital Signs








  Date Time  Temp Pulse Resp B/P (MAP) Pulse Ox O2 Delivery O2 Flow Rate FiO2


 


4/24/18 08:57    138/87    


 


4/24/18 08:10  75 18 139/87 96 Room Air  


 


4/24/18 08:00 97.2 73 16 143/91 95 Room Air  





 97.2       


 


4/24/18 08:00  71      


 


4/24/18 07:50  72 15 148/94 95 Room Air  


 


4/24/18 07:50      Room Air  


 


4/24/18 07:50     95 Room Air  


 


4/24/18 07:47  90 18  95   


 


4/24/18 07:45  70 14 140/83 96 Room Air  


 


4/24/18 07:40  76 17 133/91 100 Simple Mask 6.0 


 


4/24/18 07:37 97.4 84 18 120/41 100 Simple Mask 6.0 





 97.4       


 


4/24/18 06:59      Nasal Cannula  


 


4/24/18 06:53      Nasal Cannula  


 


4/24/18 06:45      Nasal Cannula  


 


4/24/18 06:45      Nasal Cannula  


 


4/24/18 06:45      Nasal Cannula  


 


4/24/18 03:47 98.0 71 20 134/63 99 Room Air  





 98.0       


 


4/24/18 03:47  66      


 


4/24/18 02:17  82 20  95 Nasal Cannula 2.0 28


 


4/24/18 02:10  80 18  94 Nasal Cannula 2.0 28


 


4/24/18 00:00 98.0 87 20 137/84 99 Room Air  





 98.0       

















Intake and Output  


 


 4/23/18 4/24/18





 19:00 07:00


 


Intake Total 250 ml 


 


Balance 250 ml 


 


  


 


Intake Oral 250 ml 


 


# Voids 3 1


 


# Bowel Movements 1 








Height (Feet):  5


Height (Inches):  7.00


Weight (Pounds):  218


General Appearance:  no apparent distress, alert, agitated











Eleazar Gutierrez M.D. Apr 24, 2018 23:44

## 2018-04-24 NOTE — GENERAL PROGRESS NOTE
Assessment/Plan


Problem List:  


(1) Unstable angina


ICD Codes:  I20.0 - Unstable angina


SNOMED:  4058285, 601756326


(2) Abdominal pain


ICD Codes:  R10.9 - Unspecified abdominal pain


SNOMED:  84547981


(3) Dyspnea


ICD Codes:  R06.00 - Dyspnea, unspecified


SNOMED:  635477499


(4) Asthma exacerbation


ICD Codes:  J45.901 - Unspecified asthma with (acute) exacerbation


SNOMED:  542262637


(5) Shortness of breath


ICD Codes:  R06.02 - Shortness of breath


SNOMED:  080987355


(6) Seizure disorder


Status:  stable, progressing


Assessment/Plan


steroids


resp rx 


o2


psych rx


PPi rx


wt loss d/w pt





Subjective


ROS Limited/Unobtainable:  No


Constitutional:  Reports: no symptoms


HEENT:  Reports: no symptoms


Cardiovascular:  Reports: chest pain


Respiratory:  Reports: cough, shortness of breath


Gastrointestinal/Abdominal:  Reports: abdominal pain


Genitourinary:  Reports: no symptoms


Neurologic/Psychiatric:  Reports: no symptoms


Endocrine:  Reports: no symptoms


Hematologic/Lymphatic:  Reports: no symptoms


Allergies:  


Coded Allergies:  


     No Known Allergies (Verified , 11/3/06)


All Systems:  reviewed and negative except above


Subjective


no complaints. +cough, overall feels better. states he was "joking" about using 

his gun.





Objective





Last 24 Hour Vital Signs








  Date Time  Temp Pulse Resp B/P (MAP) Pulse Ox O2 Delivery O2 Flow Rate FiO2


 


4/24/18 02:17  82 20  95 Nasal Cannula 2.0 28


 


4/24/18 02:10  80 18  94 Nasal Cannula 2.0 28 4/24/18 00:00 98.0 87 20 137/84 99 Room Air  





 98.0       


 


4/23/18 23:40  71      


 


4/23/18 23:12  84 20  96 Nasal Cannula 2.0 28


 


4/23/18 23:06  77 18  93 Nasal Cannula 2.0 28


 


4/23/18 20:53    155/72    


 


4/23/18 20:39  82 20  94 Nasal Cannula 2.0 28


 


4/23/18 20:39  82 20  94 Nasal Cannula 2.0 28


 


4/23/18 20:38  81 20  95 Nasal Cannula 2.0 28


 


4/23/18 20:33      Nasal Cannula 2.0 28


 


4/23/18 20:33     95 Nasal Cannula 2.0 28


 


4/23/18 20:33  81 18  95 Nasal Cannula 2.0 28


 


4/23/18 20:00 98.0 89 20 155/72 99 Room Air  





 98.0       


 


4/23/18 19:40  94      


 


4/23/18 16:00  106      


 


4/23/18 16:00 98.0 92 21 142/86 93 Room Air  





 98.0       


 


4/23/18 15:26  87 18  95 Nasal Cannula 2.0 28


 


4/23/18 15:18  87 18  95 Nasal Cannula 2.0 28


 


4/23/18 12:00  83      


 


4/23/18 12:00 98.0 93 18 137/76 93 Room Air  





 98.0       


 


4/23/18 11:54  84 20  94 Nasal Cannula 2.0 28


 


4/23/18 11:44  88 18  95 Nasal Cannula 2.0 28


 


4/23/18 09:52  84 20  95 Nasal Cannula 2.0 28


 


4/23/18 09:52  84 20  95 Nasal Cannula 2.0 28


 


4/23/18 08:43    115/87    


 


4/23/18 08:05     94 Nasal Cannula 2.0 28


 


4/23/18 08:05  86 18  94 Nasal Cannula 2.0 28


 


4/23/18 08:05      Nasal Cannula 2.0 28


 


4/23/18 08:05  86 20  95 Nasal Cannula 2.0 28


 


4/23/18 08:00 97.6 92 20 115/87 93 Room Air  





 97.6       


 


4/23/18 08:00  76      

















Intake and Output  


 


 4/23/18 4/24/18





 19:00 07:00


 


Intake Total 250 ml 


 


Balance 250 ml 


 


  


 


Intake Oral 250 ml 


 


# Voids 3 


 


# Bowel Movements 1 








Laboratory Tests


4/23/18 07:05: 


Sodium Level 140, Potassium Level 4.2, Chloride Level 103, Carbon Dioxide Level 

30, Anion Gap 7, Blood Urea Nitrogen 11, Creatinine 1.2, Estimat Glomerular 

Filtration Rate > 60, Glucose Level 83, Calcium Level 9.7, Total Bilirubin 0.4, 

Aspartate Amino Transf (AST/SGOT) 21, Alanine Aminotransferase (ALT/SGPT) 22, 

Alkaline Phosphatase 59, Troponin I 0.000, Total Protein 7.5, Albumin 3.5, 

Globulin 4.0, Albumin/Globulin Ratio 0.9L


Height (Feet):  5


Height (Inches):  7.00


Weight (Pounds):  218


General Appearance:  WD/WN, alert


Neck:  supple


Cardiovascular:  normal rate, regular rhythm


Respiratory/Chest:  chest wall non-tender, lungs clear, normal breath sounds


Abdomen:  normal bowel sounds, non tender, soft, no organomegaly


Edema:  no edema noted Arm (L), no edema noted Arm (R), no edema noted Leg (L), 

no edema noted Leg (R), no edema noted Pedal (L), no edema noted Pedal (R), no 

edema noted Generalized


Neurologic:  CNs II-XII grossly normal, alert, oriented x 3











JIGNA DIXON Apr 24, 2018 05:49

## 2018-04-24 NOTE — PRE-PROCEDURE NOTE/ATTESTATION
Pre-Procedure Note/Attestation


Complete Prior to Procedure


Planned Procedure:  not applicable


Procedure Narrative:


ED





Indications for Procedure


Pre-Operative Diagnosis:


GERD





Attestation


I attest that I discussed the nature of the procedure; its benefits; risks and 

complications; and alternatives (and the risks and benefits of such alternatives

), prior to the procedure, with the patient (or the patient's legal 

representative).





I attest that, if there was a reasonable possibility of needing a blood 

transfusion, the patient (or the patient's legal representative) was given the 

[facility] standardized written summary, pursuant 

to the Luiz Maddison Blood Safety Act ([place] Health and Safety Code # 1645, as 

amended).





I attest that I re-evaluated the patient just prior to the surgery and that 

there has been no change in the patient's H&P, except as documented below:











DESTINEE ROSS Apr 24, 2018 07:14

## 2018-04-24 NOTE — ANETHESIA PREOPERATIVE EVAL
Anesthesia Pre-op PMH/ROS


General


Date of Evaluation:  Apr 24, 2018


Time of Evaluation:  07:00


Anesthesiologist:  Julisa


ASA Score:  ASA 3


Mallampati Score


Class I : Soft palate, uvula, fauces, pillars visible


Class II: Soft palate, uvula, fauces visible


Class III: Soft palate, base of uvula visible


Class IV: Only hard plate visible


Mallampati Classification:  Class III


Surgeon:  Noa


Diagnosis:  Abdominal pain


Surgical Procedure:  EGD


Family History:  no anesthesia problems


Allergies:  


Coded Allergies:  


     No Known Allergies (Verified , 11/3/06)


Medications:  see eMAR





Past Medical History


Cardiovascular:  Denies: HTN, CAD, MI, valve dz, arrhythmia, other


Pulmonary:  Reports: asthma; 


   Denies: COPD, JITENDRA, other


Gastrointestinal/Genitourinary:  Reports: GERD, other - Prostate CA; 


   Denies: CRI, ESRD


Neurologic/Psychiatric:  Reports: other - Seizure d/o; 


   Denies: dementia, CVA, depression/anxiety, TIA


Endocrine:  Denies: DM, hypothyroidism, steroids, other


HEENT:  Denies: cataract (L), cataract (R), glaucoma, [place] (L), [place] (R), other


Hematology/Immune:  Denies: anemia, DVT, bleeding disorder, other


Musculoskeletal/Integumentary:  Denies: OA, RA, DJD, DDD, edema, other


PMH Narrative:


Seizures, asthma, prostate CA


PSxH Narrative:


Facial surgery secondary to trauma, Prostate





Anesthesia Pre-op Phys. Exam


Physician Exam





Last Vital Signs








  Date Time  Temp Pulse Resp B/P (MAP) Pulse Ox O2 Delivery O2 Flow Rate FiO2


 


4/24/18 06:59      Nasal Cannula  


 


4/24/18 03:47 98.0 71 20 134/63 99   





 98.0       


 


4/24/18 02:17       2.0 28








Constitutional:  NAD


Neurologic:  CN 2-12 intact


Cardiovascular:  RRR, no M/R/G


Respiratory:  CTA


Gastrointestinal:  S/NT/ND





Airway Exam


Mallampati Score:  Class III


MO:  full


ROM:  full


Teeth:  intact





Anesthesia Pre-op A/P


Studies


Pre-op Studies:  EKG - NSR





Risk Assessment & Plan


Assessment:


Class 3 patient for EGD


Plan:


GA, TIVA


Status Change Before Surgery:  No





Pre-Antibiotics


Drug:  None











Luiz Egan MD Apr 24, 2018 07:31

## 2018-04-24 NOTE — GENERAL PROGRESS NOTE
Assessment/Plan


Assessment/Plan


Assessment


- Pyrosis


- Vomiting


- RAD


- DM


- HTN


- Ischemic CM





Recommendations


- NPO


- PPI


- EGD today





POST EGD ADDENDUM


EGD: 


 - severe GERD extending for 10 cm


 - Possible long segment Dorsey's esophagus - biopsied


 - candida esophagitis


Rec:


 - Will Rx with BID PPI, QID Nystatin


 - reflux precautions


 - f/u pathology


 - Repeat EGD 2 mo





Subjective


Allergies:  


Coded Allergies:  


     No Known Allergies (Verified , 11/3/06)


Subjective


Seen in GI lab


uneventful night 


NPO for EGD


H&H improved





Objective





Last 24 Hour Vital Signs








  Date Time  Temp Pulse Resp B/P (MAP) Pulse Ox O2 Delivery O2 Flow Rate FiO2


 


4/24/18 06:59      Nasal Cannula  


 


4/24/18 06:53      Nasal Cannula  


 


4/24/18 06:45      Nasal Cannula  


 


4/24/18 06:45      Nasal Cannula  


 


4/24/18 06:45      Nasal Cannula  


 


4/24/18 03:47 98.0 71 20 134/63 99 Room Air  





 98.0       


 


4/24/18 03:47  66      


 


4/24/18 02:17  82 20  95 Nasal Cannula 2.0 28


 


4/24/18 02:10  80 18  94 Nasal Cannula 2.0 28


 


4/24/18 00:00 98.0 87 20 137/84 99 Room Air  





 98.0       


 


4/23/18 23:40  71      


 


4/23/18 23:12  84 20  96 Nasal Cannula 2.0 28


 


4/23/18 23:06  77 18  93 Nasal Cannula 2.0 28


 


4/23/18 20:53    155/72    


 


4/23/18 20:39  82 20  94 Nasal Cannula 2.0 28


 


4/23/18 20:39  82 20  94 Nasal Cannula 2.0 28


 


4/23/18 20:38  81 20  95 Nasal Cannula 2.0 28


 


4/23/18 20:33      Nasal Cannula 2.0 28


 


4/23/18 20:33     95 Nasal Cannula 2.0 28


 


4/23/18 20:33  81 18  95 Nasal Cannula 2.0 28


 


4/23/18 20:00 98.0 89 20 155/72 99 Room Air  





 98.0       


 


4/23/18 19:40  94      


 


4/23/18 16:00  106      


 


4/23/18 16:00 98.0 92 21 142/86 93 Room Air  





 98.0       


 


4/23/18 15:26  87 18  95 Nasal Cannula 2.0 28


 


4/23/18 15:18  87 18  95 Nasal Cannula 2.0 28


 


4/23/18 12:00  83      


 


4/23/18 12:00 98.0 93 18 137/76 93 Room Air  





 98.0       


 


4/23/18 11:54  84 20  94 Nasal Cannula 2.0 28


 


4/23/18 11:44  88 18  95 Nasal Cannula 2.0 28


 


4/23/18 09:52  84 20  95 Nasal Cannula 2.0 28


 


4/23/18 09:52  84 20  95 Nasal Cannula 2.0 28


 


4/23/18 08:43    115/87    


 


4/23/18 08:05     94 Nasal Cannula 2.0 28


 


4/23/18 08:05  86 18  94 Nasal Cannula 2.0 28


 


4/23/18 08:05      Nasal Cannula 2.0 28


 


4/23/18 08:05  86 20  95 Nasal Cannula 2.0 28


 


4/23/18 08:00 97.6 92 20 115/87 93 Room Air  





 97.6       


 


4/23/18 08:00  76      

















Intake and Output  


 


 4/23/18 4/24/18





 19:00 07:00


 


Intake Total 250 ml 


 


Balance 250 ml 


 


  


 


Intake Oral 250 ml 


 


# Voids 3 1


 


# Bowel Movements 1 








Height (Feet):  5


Height (Inches):  7.00


Weight (Pounds):  218


Objective


WDWN AA man


NCAT


supple


Coarse BS


RR


soft ND NT


no edema











DESTINEE ROSS Apr 24, 2018 07:19

## 2018-04-24 NOTE — IMMEDIATE POST-OP EVALUATION
Immediate Post-Op Evalulation


Immediate Post-Op Evalulation


Procedure:  EGD


Date of Evaluation:  Apr 24, 2018


Time of Evaluation:  07:47


IV Fluids:  100


Blood Pressure Systolic:  120


Blood Pressure Diastolic:  41


Pulse Rate:  90


Respiratory Rate:  18


O2 Sat by Pulse Oximetry:  95


Pain Score (1-10):  0


Nausea:  No


Vomiting:  No


Complications


No complication


Patient Status:  awake, patent, none


Hydration Status:  adequate


Drug:  None











Luiz Egan MD Apr 24, 2018 07:31

## 2018-04-24 NOTE — PROCEDURE NOTE
DATE OF PROCEDURE:  04/24/2018



GASTROENTEROLOGY PROCEDURE



PROCEDURE:  Upper gastrointestinal endoscopy with biopsy.



SURGEON:  Yasmine Latham M.D.



ANESTHESIA:  Please see the separate anesthesiologist notes for details.



PRE-ENDOSCOPIC DIAGNOSIS:  Symptoms of gastroesophageal reflux.



POST-ENDOSCOPIC DIAGNOSES:

1. Severe gastroesophageal reflux disease with reflux related erosions

and shallow ulcerations in the lower esophagus extending to a lesser

degree of about 10 cm above the gastroesophageal junction.

2. Proximal esophagus Candida esophageal colonization.

3. Status post random biopsies throughout the esophagus.



DESCRIPTION OF PROCEDURE:  The procedure, its risks, indications,

alternatives, and possible complications were explained and informed

consent was obtained.  The patient was then sedated in the left lateral

decubitus position.  A diagnostic upper endoscope was introduced through

the oropharynx and advanced to the duodenum.  Findings are as listed

above.  The endoscope was removed.  The patient was sent to recovery in

good condition.



COMPLICATIONS:  None.



RECOMMENDATIONS:

1. Proton pump inhibitor twice daily.

2. Reflux precautions.

3. Check all biopsies to rule out Dorsey's esophagus transformation.

4. Repeat endoscopy in two months.

5. Nystatin 4 times a day for 10 days.









  ______________________________________________

  Yasmine Latham M.D.





DR:  PK/PM

D:  04/24/2018 09:05

T:  04/24/2018 17:09

JOB#:  [id number]

CC:

## 2018-04-24 NOTE — PULMONOLOGY PROGRESS NOTE
Assessment/Plan


Assessment/Plan


1. Asthma with acute exacerbation.


2. Shortness of breath.


3. History of seizures.


4. History of headaches.





PLAN


Continue same for now


off Solu-Medrol 


Nebulized therapy as outlined


Oxygen therapy as needed


Ambulate as able


inhaled steroids as well as long-acting bronchodilators as is


Plan for outpatient follow-up including pulmonary function testing and allergy 

testing


impression, plan, and exam edited and reviewed in detail


care discussed with RN





Subjective


Allergies:  


Coded Allergies:  


     No Known Allergies (Verified , 11/3/06)


Subjective


Care note and reviewed


for procedure today


Patient seems stable and not in any distress





Objective





Last 24 Hour Vital Signs








  Date Time  Temp Pulse Resp B/P (MAP) Pulse Ox O2 Delivery O2 Flow Rate FiO2


 


4/24/18 07:47  90 18  95   


 


4/24/18 06:59      Nasal Cannula  


 


4/24/18 06:53      Nasal Cannula  


 


4/24/18 06:45      Nasal Cannula  


 


4/24/18 06:45      Nasal Cannula  


 


4/24/18 06:45      Nasal Cannula  


 


4/24/18 03:47 98.0 71 20 134/63 99 Room Air  





 98.0       


 


4/24/18 03:47  66      


 


4/24/18 02:17  82 20  95 Nasal Cannula 2.0 28


 


4/24/18 02:10  80 18  94 Nasal Cannula 2.0 28


 


4/24/18 00:00 98.0 87 20 137/84 99 Room Air  





 98.0       


 


4/23/18 23:40  71      


 


4/23/18 23:12  84 20  96 Nasal Cannula 2.0 28


 


4/23/18 23:06  77 18  93 Nasal Cannula 2.0 28


 


4/23/18 20:53    155/72    


 


4/23/18 20:39  82 20  94 Nasal Cannula 2.0 28


 


4/23/18 20:39  82 20  94 Nasal Cannula 2.0 28


 


4/23/18 20:38  81 20  95 Nasal Cannula 2.0 28


 


4/23/18 20:33      Nasal Cannula 2.0 28


 


4/23/18 20:33     95 Nasal Cannula 2.0 28


 


4/23/18 20:33  81 18  95 Nasal Cannula 2.0 28


 


4/23/18 20:00 98.0 89 20 155/72 99 Room Air  





 98.0       


 


4/23/18 19:40  94      


 


4/23/18 16:00  106      


 


4/23/18 16:00 98.0 92 21 142/86 93 Room Air  





 98.0       


 


4/23/18 15:26  87 18  95 Nasal Cannula 2.0 28


 


4/23/18 15:18  87 18  95 Nasal Cannula 2.0 28


 


4/23/18 12:00  83      


 


4/23/18 12:00 98.0 93 18 137/76 93 Room Air  





 98.0       


 


4/23/18 11:54  84 20  94 Nasal Cannula 2.0 28


 


4/23/18 11:44  88 18  95 Nasal Cannula 2.0 28


 


4/23/18 09:52  84 20  95 Nasal Cannula 2.0 28


 


4/23/18 09:52  84 20  95 Nasal Cannula 2.0 28


 


4/23/18 08:43    115/87    


 


4/23/18 08:05     94 Nasal Cannula 2.0 28


 


4/23/18 08:05  86 18  94 Nasal Cannula 2.0 28


 


4/23/18 08:05      Nasal Cannula 2.0 28


 


4/23/18 08:05  86 20  95 Nasal Cannula 2.0 28


 


4/23/18 08:00 97.6 92 20 115/87 93 Room Air  





 97.6       


 


4/23/18 08:00  76      

















Intake and Output  


 


 4/23/18 4/24/18





 19:00 07:00


 


Intake Total 250 ml 


 


Balance 250 ml 


 


  


 


Intake Oral 250 ml 


 


# Voids 3 1


 


# Bowel Movements 1 








Objective


Well-developed well-nourished male


No acute distress


No jugular venous distention


Lungs with reduced air entry with no wheeze or rhonchi


Cardiac exam with normal S1-S2 regular rate rhythm without murmurs rubs gallops


Exam on the abdomen is overall benign no normoactive bowel sounds


No cyanosis clubbing or edema


Neurologically grossly nonfocal and alert





Current Medications








 Medications


  (Trade)  Dose


 Ordered  Sig/Magen


 Route


 PRN Reason  Start Time


 Stop Time Status Last Admin


Dose Admin


 


 Al Hydroxide/Mg


 Hydroxide


  (Mylanta)  30 ml  BEFORE MEALS AND  HS


 ORAL


   4/22/18 16:30


 5/22/18 16:29  4/23/18 20:53


 


 


 Albuterol/


 Ipratropium


  (Albuterol/


 Ipratropium)  3 ml  Q4HRT


 HHN


   4/21/18 07:00


 4/26/18 06:59  4/24/18 02:10


 


 


 Aspirin


  (Ecotrin)  81 mg  DAILY


 ORAL


   4/23/18 09:00


 5/23/18 08:59  4/23/18 08:41


 


 


 Calcium Carbonate


  (Tums)  500 mg  THREE TIMES A DAY  PRN


 ORAL


 HEARTBURN  4/21/18 10:30


 5/21/18 10:29  4/23/18 08:49


 


 


 Famotidine


  (Pepcid)  20 mg  BID


 ORAL


   4/22/18 18:00


 5/22/18 17:59  4/23/18 18:05


 


 


 Fentanyl Citrate


  (Sublimaze 100


 mcg/2 mL)  25 mcg  Q10M  PRN


 IV


 Moderate Pain (Pain Scale 4-6)  4/24/18 07:30


 4/24/18 15:00   


 


 


 Gabapentin


  (Neurontin)  300 mg  BID


 ORAL


   4/22/18 18:00


 5/22/18 17:59  4/23/18 18:05


 


 


 Guaifenesin/


 Dextromethorphan


  (Robitussin DM)  10 ml  Q4H  PRN


 ORAL


 For Cough  4/22/18 10:00


 5/22/18 09:59  4/22/18 09:40


 


 


 Heparin Sodium


  (Porcine)


  (Heparin 5000


 units/ml)  5,000 units  EVERY 12  HOURS


 SUBQ


   4/21/18 21:00


 5/21/18 20:59  4/23/18 20:52


 


 


 Levetiracetam


  (Keppra)  750 mg  Q12HR


 ORAL


   4/22/18 21:00


 5/22/18 20:59  4/23/18 20:53


 


 


 Losartan Potassium


  (Cozaar)  50 mg  EVERY 12  HOURS


 ORAL


   4/21/18 21:00


 5/21/18 20:59  4/23/18 20:53


 


 


 Montelukast Sodium


  (Singulair)  10 mg  QPM


 ORAL


   4/21/18 16:30


 5/21/18 16:29  4/23/18 17:00


 


 


 Nystatin


  (Nystatin)  5 ml  QID


 ORAL


   4/24/18 09:00


 5/1/18 08:59   


 


 


 Ondansetron HCl


  (Zofran)  4 mg  Q1H  PRN


 IVP


 Nausea & Vomiting  4/24/18 07:30


 4/24/18 15:00   


 


 


 Ondansetron HCl


  (Zofran)  4 mg  Q4H  PRN


 IVP


 Nausea & Vomiting  4/22/18 02:00


 5/22/18 01:59  4/22/18 07:44


 


 


 Pantoprazole


  (Protonix)  40 mg  TWICE A  DAY


 ORAL


   4/24/18 09:00


 5/24/18 08:59   


 


 


 Prednisone


  (predniSONE)  20 mg  DAILY


 ORAL


   4/23/18 09:00


 5/23/18 08:59  4/23/18 08:43


 


 


 Quetiapine


 Fumarate


  (SEROquel)  300 mg  BEDTIME


 ORAL


   4/22/18 21:00


 5/22/18 20:59  4/23/18 20:53


 


 


 Salmeterol


 Xinafoate/


 Fluticasone


  (Advair 250/50


 Diskus)  1 puffs  BID


 INH


   4/22/18 18:00


 5/22/18 17:59  4/23/18 20:37


 


 


 Sodium Chloride  1,000 ml @ 


 10 mls/hr  Q24H


 IVLG


   4/24/18 07:24


 4/24/18 09:23   


 

















CARIE OLIVER Apr 24, 2018 07:59

## 2018-04-25 NOTE — PROGRESS NOTE
DATE:  04/24/2018



CARDIOLOGY PROGRESS NOTE



SUBJECTIVE:  The patient is status post upper endoscopy.  He was noted to

have severe gastroesophageal reflux with esophagitis and some candidal

plaques.



The patient is hungry and able to tolerate diet.  No chest pain or

shortness of breath.



OBJECTIVE:

VITAL SIGNS:  Blood pressure 137/84, pulse 87, respiratory rate 20.

LUNGS:  With good breath sounds.  No wheezing.

HEART:  Regular rhythm and rate.  Normal S1, S2.

ABDOMEN:  Slightly distended, but soft.

EXTREMITIES:  No edema.



IMPRESSION:

1. Chronic obstructive pulmonary disease exacerbation.

2. Obesity.

3. Pleuritic chest pain.

4. Hypertensive heart disease.

5. Gastroesophageal reflux.

6. Esophagitis.



PLAN:

1. Await the biopsy results.

2. Continue current cardiovascular regimen.

3. Aspirin prophylaxis.

4. Anti-reflux measures.

5. Weight loss.

6. Taper off steroids.

7. Continue inhaled bronchodilator therapy.

8. Discharge planning.









  ______________________________________________

  Zheng Moreno M.D. DR:  Irene

D:  04/25/2018 02:11

T:  04/25/2018 02:24

JOB#:  [id number]

CC:

## 2018-04-25 NOTE — ENDOSCOPY PROCEDURE NOTE
Endoscopy Procedure Note


General


Indication for Procedure:  GERD


Procedures Performed:  EGD


Operative Findings/Diagnosis:  GERD


Specimen:  yes


Pt Tolerated Procedure Well:  Yes


Estimated Blood Loss:  none





Anesthesia


Anesthesiologist:  see report


Anesthesia:  MAC





Medications


Medication Given:  see anesthesia record





Inserted Devices


Implant(s) used?:  No





GI Core Measures


50 yrs or older w/o bx or poly:  Not Applicable


10yrs. F/U not recommended:  Not Applicable


If not recommended, why?:  











DESTINEE ROSS Apr 25, 2018 21:06

## 2018-04-25 NOTE — BRIEF OPERATIVE NOTE
Immediate Post Operative Note


Operative Note


Chief Complaint:  GERD


Pre-op Diagnosis:


GERD


Procedure:


egd


Post-op Diagnosis:


1. Severe gastroesophageal reflux disease with reflux related erosions


and shallow ulcerations in the lower esophagus extending to a lesser


degree of about 10 cm above the gastroesophageal junction.


2. Proximal esophagus Candida esophageal colonization.


3. Status post random biopsies throughout the esophagus


Surgeon:  chaitanya


Anesthesiologist:  see report


Anesthesia:  MAC, moderate sedation


Specimen:  yes


Complications:  none


Condition:  stable


Fluids:  recorded


Estimated Blood Loss:  none


Drains:  none


Implant(s) used?:  No











EDSTINEE ROSS Apr 25, 2018 21:06

## 2018-04-26 NOTE — DISCHARGE SUMMARY
Discharge Summary


DATE OF ADMISSION: 04/21/2018


DATE OF DISCHARGE: 04/24/2018





CONSULTANTS: Dr. Steven Latham   





BRIEF HOSPITAL COURSE:


Patient is a 68-year-old male with history of prediabetes, hypertension, asthma/

COPD and ischemic cardiomyopathy presented to ED complaining of chest pain.  On 

the morning of admission, he developed substernal chest pain with shortness of 

breath, he presented to emergency room.  On evaluation, initial vital signs 

were stable.  Cardiac enzymes were negative.  He was noted to be hypokalemic.  

Chest x-ray showed pulmonary vascular congestion.  He was given breathing 

treatment, he was given aspirin and intravenous steroids.  He was then admitted 

for evaluation of shortness of breath, congestive heart failure, and COPD 

exacerbation.  He was given nebulizer therapy and oxygen therapy as needed.  He 

was eventually taken off Solu-Medrol and was given inhaled steroid as well as 

long-acting bronchodilator.


He complained of severe retrosternal pyrosis especially after eating and had 

vomiting.  There was no hematemesis.  He was given Pepcid and proton pump 

inhibitor.  He underwent EGD on 04/24/2018 by Dr. Latham.  Findings showed 

severe gastroesophageal reflux disease with reflux related  erosions and 

shallow ulcerations in the lower esophagus extending to a lesser degree of 

about 10 cm above the gastroesophageal junction.  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 

Candidal colonization.  She prescribed Nystatin 4 times daily for 10 days, and 

Protonix was increased to twice a day.  Full treatment was not carried out as 

patient left AGAINST MEDICAL ADVICE.





 FINAL DIAGNOSES: 


Acute COPD exacerbation


Obesity


Pleuritic chest pain


Hypertensive heart disease


GERD


Esophagitis





DISPOSITION: Patient left AMA





I have been assigned to dictate discharge summary on this account, and I was 

not involved in the patient's management.











Annie Montgomery NP Apr 26, 2018 14:23